# Patient Record
Sex: FEMALE | Race: WHITE | Employment: FULL TIME | URBAN - METROPOLITAN AREA
[De-identification: names, ages, dates, MRNs, and addresses within clinical notes are randomized per-mention and may not be internally consistent; named-entity substitution may affect disease eponyms.]

---

## 2017-07-06 ENCOUNTER — HOSPITAL ENCOUNTER (OUTPATIENT)
Dept: OTHER | Age: 52
Discharge: HOME | End: 2017-07-06
Attending: INTERNAL MEDICINE | Admitting: INTERNAL MEDICINE

## 2017-07-06 LAB
ALBUMIN SERPL-MCNC: 4.1 GM/DL (ref 3.4–5)
ALP BLD-CCNC: 51 IU/L (ref 40–128)
ALT SERPL-CCNC: 46 U/L (ref 10–40)
ANION GAP SERPL CALCULATED.3IONS-SCNC: 14 MMOL/L (ref 4–16)
AST SERPL-CCNC: 42 IU/L (ref 15–37)
BILIRUB SERPL-MCNC: 0.5 MG/DL (ref 0–1)
BUN BLDV-MCNC: 15 MG/DL (ref 6–23)
CALCIUM SERPL-MCNC: 9 MG/DL (ref 8.3–10.6)
CHLORIDE BLD-SCNC: 106 MMOL/L (ref 99–110)
CO2: 23 MMOL/L (ref 21–32)
CREAT SERPL-MCNC: 0.7 MG/DL (ref 0.6–1.1)
GFR AFRICAN AMERICAN: >60 ML/MIN/1.73M2
GFR NON-AFRICAN AMERICAN: >60 ML/MIN/1.73M2
GLUCOSE BLD-MCNC: 89 MG/DL (ref 70–140)
POTASSIUM SERPL-SCNC: 4.5 MMOL/L (ref 3.5–5.1)
SODIUM BLD-SCNC: 143 MMOL/L (ref 135–145)
TOTAL PROTEIN: 6.6 GM/DL (ref 6.4–8.2)

## 2017-07-08 LAB — CA 27.29: 14.8 U/ML

## 2017-07-11 ENCOUNTER — HOSPITAL ENCOUNTER (OUTPATIENT)
Dept: ULTRASOUND IMAGING | Age: 52
Discharge: HOME | End: 2017-07-11
Attending: RADIOLOGY | Admitting: RADIOLOGY

## 2017-07-11 DIAGNOSIS — C50.512 BREAST CANCER OF LOWER-OUTER QUADRANT OF LEFT FEMALE BREAST (HCC): ICD-10-CM

## 2017-07-11 DIAGNOSIS — C50.512 MALIGNANT NEOPLASM OF LOWER-OUTER QUADRANT OF LEFT FEMALE BREAST (HCC): ICD-10-CM

## 2018-07-26 ENCOUNTER — HOSPITAL ENCOUNTER (OUTPATIENT)
Dept: WOMENS IMAGING | Age: 53
Discharge: HOME | End: 2018-07-26
Attending: RADIOLOGY | Admitting: RADIOLOGY

## 2018-07-26 DIAGNOSIS — C50.512 MALIGNANT NEOPLASM OF LOWER-OUTER QUADRANT OF LEFT FEMALE BREAST, UNSPECIFIED ESTROGEN RECEPTOR STATUS (HCC): ICD-10-CM

## 2018-07-26 DIAGNOSIS — C50.512 MALIGNANT NEOPLASM OF LOWER-OUTER QUADRANT OF LEFT FEMALE BREAST (HCC): ICD-10-CM

## 2018-08-06 ENCOUNTER — HOSPITAL ENCOUNTER (OUTPATIENT)
Dept: OTHER | Age: 53
Discharge: HOME | End: 2018-08-06
Attending: INTERNAL MEDICINE | Admitting: INTERNAL MEDICINE

## 2018-08-06 LAB
ALBUMIN SERPL-MCNC: 4.3 GM/DL (ref 3.4–5)
ALP BLD-CCNC: 54 IU/L (ref 40–128)
ALT SERPL-CCNC: 33 U/L (ref 10–40)
ANION GAP SERPL CALCULATED.3IONS-SCNC: 16 MMOL/L (ref 4–16)
AST SERPL-CCNC: 30 IU/L (ref 15–37)
BILIRUB SERPL-MCNC: 0.3 MG/DL (ref 0–1)
BUN BLDV-MCNC: 14 MG/DL (ref 6–23)
CALCIUM SERPL-MCNC: 9.1 MG/DL (ref 8.3–10.6)
CHLORIDE BLD-SCNC: 104 MMOL/L (ref 99–110)
CO2: 24 MMOL/L (ref 21–32)
CREAT SERPL-MCNC: 0.7 MG/DL (ref 0.6–1.1)
GFR AFRICAN AMERICAN: >60 ML/MIN/1.73M2
GFR NON-AFRICAN AMERICAN: >60 ML/MIN/1.73M2
GLUCOSE BLD-MCNC: 78 MG/DL (ref 70–99)
POTASSIUM SERPL-SCNC: 4 MMOL/L (ref 3.5–5.1)
SODIUM BLD-SCNC: 144 MMOL/L (ref 135–145)
TOTAL PROTEIN: 6.2 GM/DL (ref 6.4–8.2)

## 2018-08-08 LAB — CA 27.29: 13.1 U/ML

## 2019-03-20 ENCOUNTER — HOSPITAL ENCOUNTER (OUTPATIENT)
Dept: ULTRASOUND IMAGING | Age: 54
End: 2019-03-20
Payer: COMMERCIAL

## 2019-03-20 ENCOUNTER — HOSPITAL ENCOUNTER (OUTPATIENT)
Dept: WOMENS IMAGING | Age: 54
Discharge: HOME | End: 2019-03-20
Payer: COMMERCIAL

## 2019-03-20 DIAGNOSIS — C50.412 MALIGNANT NEOPLASM OF UPPER-OUTER QUADRANT OF LEFT FEMALE BREAST, UNSPECIFIED ESTROGEN RECEPTOR STATUS (HCC): ICD-10-CM

## 2019-03-20 DIAGNOSIS — N64.4 BREAST PAIN: ICD-10-CM

## 2019-03-20 PROCEDURE — G0279 TOMOSYNTHESIS, MAMMO: HCPCS

## 2019-11-23 ENCOUNTER — HOSPITAL ENCOUNTER (EMERGENCY)
Age: 54
Discharge: HOME | End: 2019-11-23
Attending: EMERGENCY MEDICINE
Payer: COMMERCIAL

## 2019-11-23 VITALS
OXYGEN SATURATION: 94 % | HEART RATE: 81 BPM | HEIGHT: 64 IN | SYSTOLIC BLOOD PRESSURE: 121 MMHG | TEMPERATURE: 98.2 F | RESPIRATION RATE: 16 BRPM | BODY MASS INDEX: 27.31 KG/M2 | WEIGHT: 160 LBS | DIASTOLIC BLOOD PRESSURE: 75 MMHG

## 2019-11-23 DIAGNOSIS — R45.851 SUICIDAL IDEATION: ICD-10-CM

## 2019-11-23 DIAGNOSIS — F41.9 ANXIETY: Primary | ICD-10-CM

## 2019-11-23 LAB
ACETAMINOPHEN LEVEL: <5 UG/ML (ref 15–30)
ALBUMIN SERPL-MCNC: 4.5 GM/DL (ref 3.4–5)
ALCOHOL SCREEN SERUM: 0.14 %WT/VOL
ALCOHOL SCREEN SERUM: NORMAL %WT/VOL
ALP BLD-CCNC: 60 IU/L (ref 40–129)
ALT SERPL-CCNC: 28 U/L (ref 10–40)
AMPHETAMINES: NEGATIVE
ANION GAP SERPL CALCULATED.3IONS-SCNC: 13 MMOL/L (ref 4–16)
AST SERPL-CCNC: 27 IU/L (ref 15–37)
BARBITURATE SCREEN URINE: NEGATIVE
BASOPHILS ABSOLUTE: 0 K/CU MM
BASOPHILS RELATIVE PERCENT: 0.3 % (ref 0–1)
BENZODIAZEPINE SCREEN, URINE: NEGATIVE
BILIRUB SERPL-MCNC: 0.2 MG/DL (ref 0–1)
BUN BLDV-MCNC: 9 MG/DL (ref 6–23)
CALCIUM SERPL-MCNC: 9 MG/DL (ref 8.3–10.6)
CANNABINOID SCREEN URINE: NEGATIVE
CHLORIDE BLD-SCNC: 107 MMOL/L (ref 99–110)
CO2: 25 MMOL/L (ref 21–32)
COCAINE METABOLITE: ABNORMAL
CREAT SERPL-MCNC: 0.9 MG/DL (ref 0.6–1.1)
DIFFERENTIAL TYPE: ABNORMAL
DOSE AMOUNT: ABNORMAL
DOSE AMOUNT: ABNORMAL
DOSE TIME: ABNORMAL
DOSE TIME: ABNORMAL
EOSINOPHILS ABSOLUTE: 0.1 K/CU MM
EOSINOPHILS RELATIVE PERCENT: 1.2 % (ref 0–3)
GFR AFRICAN AMERICAN: >60 ML/MIN/1.73M2
GFR NON-AFRICAN AMERICAN: >60 ML/MIN/1.73M2
GLUCOSE BLD-MCNC: 106 MG/DL (ref 70–99)
HCT VFR BLD CALC: 45.8 % (ref 37–47)
HEMOGLOBIN: 14.9 GM/DL (ref 12.5–16)
IMMATURE NEUTROPHIL %: 0.2 % (ref 0–0.43)
LYMPHOCYTES ABSOLUTE: 2.8 K/CU MM
LYMPHOCYTES RELATIVE PERCENT: 26.8 % (ref 24–44)
MCH RBC QN AUTO: 32.8 PG (ref 27–31)
MCHC RBC AUTO-ENTMCNC: 32.5 % (ref 32–36)
MCV RBC AUTO: 100.9 FL (ref 78–100)
MONOCYTES ABSOLUTE: 0.5 K/CU MM
MONOCYTES RELATIVE PERCENT: 5 % (ref 0–4)
NUCLEATED RBC %: 0 %
OPIATES, URINE: NEGATIVE
OXYCODONE: ABNORMAL
PDW BLD-RTO: 12.9 % (ref 11.7–14.9)
PHENCYCLIDINE, URINE: NEGATIVE
PLATELET # BLD: 299 K/CU MM (ref 140–440)
PMV BLD AUTO: 9.9 FL (ref 7.5–11.1)
POTASSIUM SERPL-SCNC: 4.2 MMOL/L (ref 3.5–5.1)
RBC # BLD: 4.54 M/CU MM (ref 4.2–5.4)
SALICYLATE LEVEL: 1 MG/DL (ref 15–30)
SEGMENTED NEUTROPHILS ABSOLUTE COUNT: 6.9 K/CU MM
SEGMENTED NEUTROPHILS RELATIVE PERCENT: 66.5 % (ref 36–66)
SODIUM BLD-SCNC: 145 MMOL/L (ref 135–145)
TOTAL IMMATURE NEUTOROPHIL: 0.02 K/CU MM
TOTAL NUCLEATED RBC: 0 K/CU MM
TOTAL PROTEIN: 7.4 GM/DL (ref 6.4–8.2)
WBC # BLD: 10.3 K/CU MM (ref 4–10.5)

## 2019-11-23 PROCEDURE — 6370000000 HC RX 637 (ALT 250 FOR IP): Performed by: EMERGENCY MEDICINE

## 2019-11-23 PROCEDURE — 85025 COMPLETE CBC W/AUTO DIFF WBC: CPT

## 2019-11-23 PROCEDURE — G0480 DRUG TEST DEF 1-7 CLASSES: HCPCS

## 2019-11-23 PROCEDURE — 80053 COMPREHEN METABOLIC PANEL: CPT

## 2019-11-23 PROCEDURE — 99284 EMERGENCY DEPT VISIT MOD MDM: CPT

## 2019-11-23 PROCEDURE — 36415 COLL VENOUS BLD VENIPUNCTURE: CPT

## 2019-11-23 PROCEDURE — 80307 DRUG TEST PRSMV CHEM ANLYZR: CPT

## 2019-11-23 RX ORDER — LORAZEPAM 1 MG/1
1 TABLET ORAL ONCE
Status: COMPLETED | OUTPATIENT
Start: 2019-11-23 | End: 2019-11-23

## 2019-11-23 RX ADMIN — LORAZEPAM 1 MG: 1 TABLET ORAL at 11:40

## 2020-02-20 ENCOUNTER — HOSPITAL ENCOUNTER (OUTPATIENT)
Dept: INFUSION THERAPY | Age: 55
Discharge: HOME | End: 2020-02-20
Payer: COMMERCIAL

## 2020-02-20 LAB
ALBUMIN SERPL-MCNC: 4.4 GM/DL (ref 3.4–5)
ALP BLD-CCNC: 61 IU/L (ref 40–128)
ALT SERPL-CCNC: 53 U/L (ref 10–40)
ANION GAP SERPL CALCULATED.3IONS-SCNC: 14 MMOL/L (ref 4–16)
AST SERPL-CCNC: 47 IU/L (ref 15–37)
BASOPHILS ABSOLUTE: 0 K/CU MM
BASOPHILS RELATIVE PERCENT: 0.2 % (ref 0–1)
BILIRUB SERPL-MCNC: 0.3 MG/DL (ref 0–1)
BUN BLDV-MCNC: 15 MG/DL (ref 6–23)
CALCIUM SERPL-MCNC: 9.4 MG/DL (ref 8.3–10.6)
CHLORIDE BLD-SCNC: 103 MMOL/L (ref 99–110)
CO2: 25 MMOL/L (ref 21–32)
CREAT SERPL-MCNC: 0.7 MG/DL (ref 0.6–1.1)
DIFFERENTIAL TYPE: ABNORMAL
EOSINOPHILS ABSOLUTE: 0.2 K/CU MM
EOSINOPHILS RELATIVE PERCENT: 2.4 % (ref 0–3)
GFR AFRICAN AMERICAN: >60 ML/MIN/1.73M2
GFR NON-AFRICAN AMERICAN: >60 ML/MIN/1.73M2
GLUCOSE BLD-MCNC: 108 MG/DL (ref 70–99)
HCT VFR BLD CALC: 45.3 % (ref 37–47)
HEMOGLOBIN: 15 GM/DL (ref 12.5–16)
LYMPHOCYTES ABSOLUTE: 2.4 K/CU MM
LYMPHOCYTES RELATIVE PERCENT: 26 % (ref 24–44)
MCH RBC QN AUTO: 33.3 PG (ref 27–31)
MCHC RBC AUTO-ENTMCNC: 33.1 % (ref 32–36)
MCV RBC AUTO: 100.4 FL (ref 78–100)
MONOCYTES ABSOLUTE: 0.5 K/CU MM
MONOCYTES RELATIVE PERCENT: 5.8 % (ref 0–4)
PDW BLD-RTO: 14 % (ref 11.7–14.9)
PLATELET # BLD: 290 K/CU MM (ref 140–440)
PMV BLD AUTO: 10.2 FL (ref 7.5–11.1)
POTASSIUM SERPL-SCNC: 4.4 MMOL/L (ref 3.5–5.1)
RBC # BLD: 4.51 M/CU MM (ref 4.2–5.4)
SEGMENTED NEUTROPHILS ABSOLUTE COUNT: 6.1 K/CU MM
SEGMENTED NEUTROPHILS RELATIVE PERCENT: 65.6 % (ref 36–66)
SODIUM BLD-SCNC: 142 MMOL/L (ref 135–145)
TOTAL PROTEIN: 7.1 GM/DL (ref 6.4–8.2)
WBC # BLD: 9.3 K/CU MM (ref 4–10.5)

## 2020-02-20 PROCEDURE — 86300 IMMUNOASSAY TUMOR CA 15-3: CPT

## 2020-02-20 PROCEDURE — 36592 COLLECT BLOOD FROM PICC: CPT

## 2020-02-20 PROCEDURE — 36415 COLL VENOUS BLD VENIPUNCTURE: CPT

## 2020-02-20 PROCEDURE — 80053 COMPREHEN METABOLIC PANEL: CPT

## 2020-02-20 PROCEDURE — 85025 COMPLETE CBC W/AUTO DIFF WBC: CPT

## 2020-02-23 LAB
CA 27.29: 15 U/ML (ref 0–40)
CA 27.29: NORMAL U/ML (ref 0–40)

## 2020-03-12 ENCOUNTER — HOSPITAL ENCOUNTER (OUTPATIENT)
Dept: MRI IMAGING | Age: 55
Discharge: HOME | End: 2020-03-12
Payer: COMMERCIAL

## 2020-03-12 ENCOUNTER — HOSPITAL ENCOUNTER (OUTPATIENT)
Dept: NUCLEAR MEDICINE | Age: 55
Discharge: HOME | End: 2020-03-12
Payer: COMMERCIAL

## 2020-03-12 PROCEDURE — 6360000004 HC RX CONTRAST MEDICATION: Performed by: INTERNAL MEDICINE

## 2020-03-12 PROCEDURE — 72156 MRI NECK SPINE W/O & W/DYE: CPT

## 2020-03-12 PROCEDURE — A9579 GAD-BASE MR CONTRAST NOS,1ML: HCPCS | Performed by: INTERNAL MEDICINE

## 2020-03-12 PROCEDURE — A9503 TC99M MEDRONATE: HCPCS | Performed by: INTERNAL MEDICINE

## 2020-03-12 PROCEDURE — 3430000000 HC RX DIAGNOSTIC RADIOPHARMACEUTICAL: Performed by: INTERNAL MEDICINE

## 2020-03-12 PROCEDURE — 78306 BONE IMAGING WHOLE BODY: CPT

## 2020-03-12 RX ORDER — TC 99M MEDRONATE 20 MG/10ML
25 INJECTION, POWDER, LYOPHILIZED, FOR SOLUTION INTRAVENOUS
Status: COMPLETED | OUTPATIENT
Start: 2020-03-12 | End: 2020-03-12

## 2020-03-12 RX ADMIN — TC 99M MEDRONATE 25 MILLICURIE: 20 INJECTION, POWDER, LYOPHILIZED, FOR SOLUTION INTRAVENOUS at 09:50

## 2020-03-12 RX ADMIN — GADOTERIDOL 15 ML: 279.3 INJECTION, SOLUTION INTRAVENOUS at 10:22

## 2020-08-07 PROBLEM — R30.9 PAINFUL MICTURITION, UNSPECIFIED: Status: ACTIVE | Noted: 2020-08-07

## 2020-08-07 PROBLEM — M89.8X9 OTHER SPECIFIED DISORDERS OF BONE, UNSPECIFIED SITE: Status: ACTIVE | Noted: 2020-08-07

## 2020-08-07 PROBLEM — G64 OTHER DISORDERS OF PERIPHERAL NERVOUS SYSTEM: Status: ACTIVE | Noted: 2020-08-07

## 2020-08-07 PROBLEM — R35.0 FREQUENCY OF MICTURITION: Status: ACTIVE | Noted: 2020-08-07

## 2020-08-07 PROBLEM — C50.412 MALIGNANT NEOPLASM OF UPPER-OUTER QUADRANT OF LEFT FEMALE BREAST (HCC): Status: ACTIVE | Noted: 2020-08-07

## 2021-01-28 DIAGNOSIS — Z12.31 SCREENING MAMMOGRAM FOR HIGH-RISK PATIENT: ICD-10-CM

## 2021-02-03 ENCOUNTER — HOSPITAL ENCOUNTER (OUTPATIENT)
Dept: WOMENS IMAGING | Age: 56
Discharge: HOME | End: 2021-02-03
Payer: COMMERCIAL

## 2021-02-03 DIAGNOSIS — Z12.31 SCREENING MAMMOGRAM FOR HIGH-RISK PATIENT: ICD-10-CM

## 2021-02-03 PROCEDURE — 77063 BREAST TOMOSYNTHESIS BI: CPT

## 2021-02-10 ENCOUNTER — HOSPITAL ENCOUNTER (OUTPATIENT)
Dept: INFUSION THERAPY | Age: 56
Discharge: HOME | End: 2021-02-10
Payer: COMMERCIAL

## 2021-02-10 ENCOUNTER — OFFICE VISIT (OUTPATIENT)
Dept: ONCOLOGY | Age: 56
End: 2021-02-10
Payer: COMMERCIAL

## 2021-02-10 VITALS
HEART RATE: 112 BPM | BODY MASS INDEX: 29.5 KG/M2 | RESPIRATION RATE: 16 BRPM | HEIGHT: 64 IN | DIASTOLIC BLOOD PRESSURE: 91 MMHG | WEIGHT: 172.8 LBS | SYSTOLIC BLOOD PRESSURE: 151 MMHG | OXYGEN SATURATION: 96 % | TEMPERATURE: 96.7 F

## 2021-02-10 DIAGNOSIS — C50.912 MALIGNANT NEOPLASM OF LEFT BREAST IN FEMALE, ESTROGEN RECEPTOR POSITIVE, UNSPECIFIED SITE OF BREAST (HCC): Primary | ICD-10-CM

## 2021-02-10 DIAGNOSIS — Z17.0 MALIGNANT NEOPLASM OF LEFT BREAST IN FEMALE, ESTROGEN RECEPTOR POSITIVE, UNSPECIFIED SITE OF BREAST (HCC): ICD-10-CM

## 2021-02-10 DIAGNOSIS — Z17.0 MALIGNANT NEOPLASM OF LEFT BREAST IN FEMALE, ESTROGEN RECEPTOR POSITIVE, UNSPECIFIED SITE OF BREAST (HCC): Primary | ICD-10-CM

## 2021-02-10 DIAGNOSIS — Z78.0 MENOPAUSE: ICD-10-CM

## 2021-02-10 DIAGNOSIS — C50.912 MALIGNANT NEOPLASM OF LEFT BREAST IN FEMALE, ESTROGEN RECEPTOR POSITIVE, UNSPECIFIED SITE OF BREAST (HCC): ICD-10-CM

## 2021-02-10 LAB
ALBUMIN SERPL-MCNC: 4.3 GM/DL (ref 3.4–5)
ALP BLD-CCNC: 59 IU/L (ref 40–129)
ALT SERPL-CCNC: 49 U/L (ref 10–40)
ANION GAP SERPL CALCULATED.3IONS-SCNC: 11 MMOL/L (ref 4–16)
AST SERPL-CCNC: 46 IU/L (ref 15–37)
BASOPHILS ABSOLUTE: 0 K/CU MM
BASOPHILS RELATIVE PERCENT: 0.2 % (ref 0–1)
BILIRUB SERPL-MCNC: 0.4 MG/DL (ref 0–1)
BUN BLDV-MCNC: 9 MG/DL (ref 6–23)
CALCIUM SERPL-MCNC: 9.4 MG/DL (ref 8.3–10.6)
CHLORIDE BLD-SCNC: 101 MMOL/L (ref 99–110)
CO2: 26 MMOL/L (ref 21–32)
CREAT SERPL-MCNC: 1 MG/DL (ref 0.6–1.1)
DIFFERENTIAL TYPE: ABNORMAL
EOSINOPHILS ABSOLUTE: 0.2 K/CU MM
EOSINOPHILS RELATIVE PERCENT: 2.1 % (ref 0–3)
GFR AFRICAN AMERICAN: >60 ML/MIN/1.73M2
GFR NON-AFRICAN AMERICAN: 58 ML/MIN/1.73M2
GLUCOSE BLD-MCNC: 83 MG/DL (ref 70–99)
HCT VFR BLD CALC: 45.5 % (ref 37–47)
HEMOGLOBIN: 15.3 GM/DL (ref 12.5–16)
LYMPHOCYTES ABSOLUTE: 2.6 K/CU MM
LYMPHOCYTES RELATIVE PERCENT: 24.7 % (ref 24–44)
MCH RBC QN AUTO: 33.1 PG (ref 27–31)
MCHC RBC AUTO-ENTMCNC: 33.6 % (ref 32–36)
MCV RBC AUTO: 98.5 FL (ref 78–100)
MONOCYTES ABSOLUTE: 0.7 K/CU MM
MONOCYTES RELATIVE PERCENT: 6.3 % (ref 0–4)
PDW BLD-RTO: 13.9 % (ref 11.7–14.9)
PLATELET # BLD: 292 K/CU MM (ref 140–440)
PMV BLD AUTO: 10 FL (ref 7.5–11.1)
POTASSIUM SERPL-SCNC: 4.4 MMOL/L (ref 3.5–5.1)
RBC # BLD: 4.62 M/CU MM (ref 4.2–5.4)
SEGMENTED NEUTROPHILS ABSOLUTE COUNT: 7.1 K/CU MM
SEGMENTED NEUTROPHILS RELATIVE PERCENT: 66.7 % (ref 36–66)
SODIUM BLD-SCNC: 138 MMOL/L (ref 135–145)
TOTAL PROTEIN: 7.1 GM/DL (ref 6.4–8.2)
WBC # BLD: 10.6 K/CU MM (ref 4–10.5)

## 2021-02-10 PROCEDURE — 86300 IMMUNOASSAY TUMOR CA 15-3: CPT

## 2021-02-10 PROCEDURE — 85025 COMPLETE CBC W/AUTO DIFF WBC: CPT

## 2021-02-10 PROCEDURE — 36415 COLL VENOUS BLD VENIPUNCTURE: CPT

## 2021-02-10 PROCEDURE — 99211 OFF/OP EST MAY X REQ PHY/QHP: CPT

## 2021-02-10 PROCEDURE — 99214 OFFICE O/P EST MOD 30 MIN: CPT | Performed by: INTERNAL MEDICINE

## 2021-02-10 PROCEDURE — 80053 COMPREHEN METABOLIC PANEL: CPT

## 2021-02-10 RX ORDER — IBUPROFEN 600 MG/1
600 TABLET ORAL EVERY 6 HOURS PRN
COMMUNITY

## 2021-02-10 RX ORDER — ANASTROZOLE 1 MG/1
1 TABLET ORAL DAILY
Qty: 90 TABLET | Refills: 5 | Status: SHIPPED | OUTPATIENT
Start: 2021-02-10 | End: 2022-04-04

## 2021-02-10 RX ORDER — OLANZAPINE 5 MG/1
TABLET ORAL
COMMUNITY
Start: 2020-12-09 | End: 2021-09-13 | Stop reason: SDUPTHER

## 2021-02-10 RX ORDER — VENLAFAXINE 75 MG/1
TABLET ORAL
COMMUNITY
Start: 2020-12-09 | End: 2021-12-17 | Stop reason: SDUPTHER

## 2021-02-10 ASSESSMENT — PATIENT HEALTH QUESTIONNAIRE - PHQ9
SUM OF ALL RESPONSES TO PHQ QUESTIONS 1-9: 2
SUM OF ALL RESPONSES TO PHQ QUESTIONS 1-9: 2

## 2021-02-10 NOTE — PROGRESS NOTES
MA Rooming Questions  Patient: Soila Kathleen  MRN: [id number]    Date: 2/10/2021        1. Do you have any new issues?   no         2. Do you need any refills on medications?    no    3. Have you had any imaging done since your last visit?   no    4. Have you been hospitalized or seen in the emergency room since your last visit here?   no    5. Did the patient have a depression screening completed today?  Yes    PHQ-9 Total Score: 2 (2/10/2021  2:00 PM)       PHQ-9 Given to (if applicable):               PHQ-9 Score (if applicable):                     [] Positive     [x]  Negative              Does question #9 need addressed (if applicable)                     [] Yes    []  No               Jacquelin Onofre MA

## 2021-02-10 NOTE — PROGRESS NOTES
Patient Name: Alejandra Love  Patient : 1965  Patient MRN: [id number]     Primary Oncologist: Nick Huynh MD  PCP:Dr Lacey Duran     Date of Service: 2/10/2021      Chief Complaint:   Chief Complaint   Patient presents with    Follow-up        Active Problem list  1. Malignant neoplasm of left breast in female, estrogen receptor positive, unspecified site of breast [facility])           HPI:        Mrs. Chad Ruelas is an extremely pleasant young lady, patient of Dr. Florina Naylor and MARCELA Page Date of birth 1965. She saw Ms. Lacey Duran on 2015, with about a one week history of noticing a lump involving the lateral part of her left breast, which led to mammogram on 2015, as well as ultrasound showing suspicious abnormality involving the left breast measuring around 0.7 cm. Also normal left axillary lymph node. She did undergo ultrasound confirming the above findings and on 2015, did undergo ultrasound-guided biopsy of the left breast as well as ultrasound-guided biopsy of the left axillary node, too. Pathology from the left breast at 4 o'clock position revealed infiltrating ductal carcinoma. Left axillary node did reveal metastatic ductal carcinoma. ER 95 percent positive, WI 95 percent positive, HER-2 negative. .. Saw Dr. Florina Naylor on 2015, was scheduled to have a lumpectomy as well as axillary dissection on 2015. ... 2015, she did undergo lumpectomy and axillary dissection per Dr. Florina Naylor. Pathology revealed multifocal three separate lesions measuring 0.7, 0.7, and 0.3 cm. Moderately differentiated infiltrating ductal carcinoma. Margins were negative, 5/15 lymph nodes were positive for macrometastases, one with micrometastases; pT1b N2a disease. Recovered well from surgery. A port was inserted, too. ...      2015, chest x-ray was negative for any abnormality, bone scan 2015, was negative for any obvious metastases, too.     CT scan of the chest done on May 7, 2015, revealed 1.3 by 1.9 cm left axillary versus chest wall area lymph node like structure indeterminate nature could be postoperative; however, metastatic disease was thought of, too. No CT evidence of any metastatic disease in the chest. A 3 mm non-calcified subpleural nodule in the right costophrenic angle, which had been stable since September 2014, too. Cardiac echo done on May 7, 2015, had revealed normal ejection fraction, too. ... May 11, 2015, started on day 1, day 8 A/C and finished on August 10, 2015, tolerated these treatments with mild degree of symptoms of weakness, tiredness, as well as hot flashes and weight gain. September 1, 2015, started on weekly Taxol, finishing on November 30, 2015, with some symptoms of weakness and tiredness and some symptoms of neuropathy as well as some skin changes. December 10, 2015, did talk about the disease process and need to proceed with radiation therapy treatments as well as treatment with tamoxifen. Did receive radiation therapy treatments per Dr. Kulwinder Javier from January 11, 2016, to March 1, 2016. Tolerated those treatments with mild degree of morbidity. CT scan of the chest June 23, 2016, was negative for any obvious metastases. Mammogram May 2016 too was negative. She came to see me on August 6, 2018. On her previous visit to the office in July 2017, Effexor dose was increased to 75 mg daily, was encouraged to stay on tamoxifen and refrain from smoking. On her visit, stated she was taking tamoxifen on a regular basis but was still smoking. Had a mammogram done on July 26, 2018 and that was unremarkable. Was seen in the office on February 26, 2019. Stated that after her last visit here on August 6, 2018 she did stop smoking. Mammogram done on March 28, 2019 was benign. She came to see me on February 20, 2020. In the interim she had been taking the tamoxifen on a regular basis. Had been working.  Stated that she has started smoking again. She did complain of having significant worsening of her bony pain involving the neck area having to take ibuprofen 600 mg 2 or 3 times per day. Studies done after her visit here included bone scan that was negative for any definite metastatic changes. MRI of the cervical spine revealed some muscle spasm causing straightening of the cervical spine. There was moderate C5-C6 spinal canal stenosis. Tumor markers were unremarkable so was chemistries except for mild elevation of liver function studies possibly from the use of alcohol. CBC had revealed mild macrocytosis also possibly from the use of alcohol. Dr Gage Mail Notes:   ------------------------  2/3/21: mammogram:  1. No mammographic evidence of malignancy. 2. Stable lumpectomy changes in the left outer breast at 9 o'clock.       BIRADS:   BIRADS - CATEGORY 2       PAST MEDICAL PROBLEMS:  Mrs. Spencer in general had been blessed with good health. Has not had any major health problems to speak of, except for tonsillectomy and D&C. FAMILY HISTORY:  Great maternal aunt had carcinoma of the breast. Interestingly her two other sister in laws also had carcinoma of the breast, too. SOCIAL HISTORY:  Long history of smoking, more than one pack for 30 years or more, moderate history of ethanol intake. She is , mother of two boys, and works at [email or web address]. Interval History  2/10/2021: Arrived alone to the clinic today. Reported hot flashes , but not well controlled with Effexor. No chest pain, increased sob, palpitations or any  dizzness. No new breast masses. No nipple discharge. No axillary area. No weight loss. No abdominal pain, nausea, emesis, diarrhea or any constipation. No  sx. No HA. Reported fatigue.      Review of Systems   Per interval history; otherwise 10 point ROS is negative              Vital Signs:  BP (!) 151/91 (Site: Right Upper Arm, Position: Sitting, Cuff Size: Medium Adult)   Pulse 112 Temp 96.7 °F (35.9 °C) (Infrared)   Resp 16   Ht 5' 4\" (1.626 m)   Wt 172 lb 12.8 oz (78.4 kg)   LMP 05/06/2015   SpO2 96%   BMI 29.66 kg/m²     Physical Exam:    CONSTITUTIONAL: awake, alert, obese  EYES:RAN, No palor or any icetrus  ENT: ATNC  NECK: No JVD  HEMATOLOGIC/LYMPHATIC: no cervical, supraclavicular or axillary lymphadenopathy   LUNGS: CTAB  CARDIOVASCULAR: s1s2 tachycardia, no murmurs  ABDOMEN: soft ntnd b spos  NEUROLOGIC: GI  SKIN: No rash  EXTREMITIES: no LE edema bilaterally  Breast: Right breast with no mass, left breast, intact scar inferiolateral, No mass, no nipple discharge. No axillary lad bilaterally.      Labs:    Hematology:  Lab Results   Component Value Date    WBC 9.3 02/20/2020    RBC 4.51 02/20/2020    HGB 15.0 02/20/2020    HCT 45.3 02/20/2020    .4 (H) 02/20/2020    MCH 33.3 (H) 02/20/2020    MCHC 33.1 02/20/2020    RDW 14.0 02/20/2020     02/20/2020    MPV 10.2 02/20/2020    SEGSPCT 65.6 02/20/2020    EOSRELPCT 2.4 02/20/2020    BASOPCT 0.2 02/20/2020    LYMPHOPCT 26.0 02/20/2020    MONOPCT 5.8 (H) 02/20/2020    SEGSABS 6.1 02/20/2020    EOSABS 0.2 02/20/2020    BASOSABS 0.0 02/20/2020    LYMPHSABS 2.4 02/20/2020    MONOSABS 0.5 02/20/2020    DIFFTYPE AUTOMATED DIFFERENTIAL 02/20/2020     No results found for: ESR    Chemistry:  Lab Results   Component Value Date     02/20/2020    K 4.4 02/20/2020     02/20/2020    CO2 25 02/20/2020    BUN 15 02/20/2020    CREATININE 0.7 02/20/2020    GLUCOSE 108 (H) 02/20/2020    CALCIUM 9.4 02/20/2020    PROT 7.1 02/20/2020    LABALBU 4.4 02/20/2020    BILITOT 0.3 02/20/2020    ALKPHOS 61 02/20/2020    AST 47 (H) 02/20/2020    ALT 53 (H) 02/20/2020    LABGLOM >60 02/20/2020    GFRAA >60 02/20/2020     No results found for: MMA, LDH, HOMOCYSTEINE  No components found for: LD  No results found for: TSHHS, T4FREE, FT3    Immunology:  Lab Results   Component Value Date    PROT 7.1 02/20/2020     No results found for: MORNEA Wiggins  No results found for: B2M    Coagulation Panel:  No results found for: PROTIME, INR, APTT, DDIMER    Anemia Panel:  No results found for: NVMQRIRO79, FOLATE    Tumor Markers:  Lab Results   Component Value Date    LABCA2 15.0 2020    LABCA2  2020     (NOTE)  INTERPRETIVE INFORMATION: Cancer Antigen 27.29  The CA 27.29 assay is intended for use in monitorin) disease   progression and/or response to therapy in patients with metastatic   disease, and 2) disease recurrence in patients treated previously   for stages II or III breast carcinoma who are clinically free of   the disease. Serial testing in patients who are clinically free of   disease should be used in conjunction with other clinical methods   for early detection of cancer recurrence. Limitations: Patients with confirmed breast carcinoma frequently   have CA 27.29 assay values in the same range as healthy   individuals. Elevations may also be observed in patients with non   malignant disease. Results of this test must always be interpreted   in the context of morphologic and other relevant data, and should   not be used alone for a diagnosis of malignancy. Methodology: Siemens Advia Darkstrandaur CA 27.29 chemiluminescent   immunoassay was used. Results obtained with different assay   methods or kits cannot be used interchangeably. [facility]formed [facility] [address], [address] [facility] [phone]  www. Carmencita Head MD, Lab. Director           Imaging: Reviewed     Pathology:Reviewed     Observations:  Performance Status: ECOG 0  Depression Status: PHQ-9 Total Score: 2 (2/10/2021  2:00 PM)          Assessment & Plan:  Extremely pleasant young lady, longstanding history of smoking, mild to moderate degree of ethanol intake.  Premenopausal.     Felt a lump involving the left breast, which lead to mammogram, ultrasound, and biopsy on 2015, showing infiltrating moderately-differentiated ductal carcinoma, strongly positive ER, IA, and negative HER-2. April 16, 2015, did undergo additional lumpectomy and axillary dissection. Three separate lesions were noted, 0.7, 0.7, 0.3 cm, and 5/15 axillary nodes were positive for involvement. Chest x-ray, bone scan, and CT scan of the chest were negative for any obvious metastases. A CT of the chest did show a small lymph node like structure in the axillary area or the chest wall area possibly relating to surgery, too. A 4 mm nodule was described, too. A cardiac echo had revealed a normal ejection fraction, too. May 20, 2015, started on A/C day one day eight schedule, finishing August 10, 2015. September 1, 2015, started on weekly Taxol, finishing on November 30, 2015. Tolerated these treatments with mild to moderate degree of morbidit    December 10, 2015, did talk about starting therapy with tamoxifen as well as radiation therapy. Finished receiving radiation therapy treatments as described above on March 1, 2016. June 2016 started on Effexor for hot flashes. Long discussion with her on her visit to the office on August 6, 2018. We talked about of course need for her to stay on tamoxifen that she had been taking on a regular basis did write her a new prescription of 20 mg tablets daily. We talked about staying on Effexor to manage some of her symptoms of hot flashes    We also talked about need for her to have colonoscopy exam. She though wanted for now to hold off as her grandmother had bad experience with a colonoscopy. Mammogram March 2019 was benign. March 17, 2020 we did review the results. No obvious signs of recurrence of the disease. Did reassure her in that regards. Advised her of course to stay on tamoxifen and December 2020 possibly will need to change to an AI. She was noted to have mild elevation of MCV as well as liver function studies possibly relating to alcohol.     She was to see me again in 6 months with

## 2021-02-12 ENCOUNTER — TELEPHONE (OUTPATIENT)
Dept: ONCOLOGY | Age: 56
End: 2021-02-12

## 2021-02-12 LAB — CA 27.29: 17.1 U/ML (ref 0–40)

## 2021-02-23 ENCOUNTER — TELEPHONE (OUTPATIENT)
Dept: ONCOLOGY | Age: 56
End: 2021-02-23

## 2021-02-23 NOTE — TELEPHONE ENCOUNTER
Called patient with appt time for education of new medication, Arimidex, schedule for Wed. 03/03 @ 1100, patient states understanding but didn't realize she was to have education, already started taking medication

## 2021-02-24 ENCOUNTER — HOSPITAL ENCOUNTER (OUTPATIENT)
Dept: WOMENS IMAGING | Age: 56
Discharge: HOME | End: 2021-02-24
Payer: COMMERCIAL

## 2021-02-24 DIAGNOSIS — Z78.0 MENOPAUSE: ICD-10-CM

## 2021-02-24 DIAGNOSIS — Z17.0 MALIGNANT NEOPLASM OF LEFT BREAST IN FEMALE, ESTROGEN RECEPTOR POSITIVE, UNSPECIFIED SITE OF BREAST (HCC): ICD-10-CM

## 2021-02-24 DIAGNOSIS — C50.912 MALIGNANT NEOPLASM OF LEFT BREAST IN FEMALE, ESTROGEN RECEPTOR POSITIVE, UNSPECIFIED SITE OF BREAST (HCC): ICD-10-CM

## 2021-02-24 PROCEDURE — 77080 DXA BONE DENSITY AXIAL: CPT

## 2021-04-14 ENCOUNTER — HOSPITAL ENCOUNTER (OUTPATIENT)
Dept: INFUSION THERAPY | Age: 56
Discharge: HOME | End: 2021-04-14
Payer: COMMERCIAL

## 2021-04-14 ENCOUNTER — INITIAL CONSULT (OUTPATIENT)
Dept: ONCOLOGY | Age: 56
End: 2021-04-14
Payer: COMMERCIAL

## 2021-04-14 VITALS
WEIGHT: 177.2 LBS | BODY MASS INDEX: 30.25 KG/M2 | OXYGEN SATURATION: 96 % | TEMPERATURE: 96.9 F | HEART RATE: 94 BPM | SYSTOLIC BLOOD PRESSURE: 157 MMHG | DIASTOLIC BLOOD PRESSURE: 67 MMHG | RESPIRATION RATE: 18 BRPM | HEIGHT: 64 IN

## 2021-04-14 DIAGNOSIS — C50.912 MALIGNANT NEOPLASM OF LEFT BREAST IN FEMALE, ESTROGEN RECEPTOR POSITIVE, UNSPECIFIED SITE OF BREAST (HCC): Primary | ICD-10-CM

## 2021-04-14 DIAGNOSIS — R59.9 ENLARGED LYMPH NODE: ICD-10-CM

## 2021-04-14 DIAGNOSIS — Z17.0 MALIGNANT NEOPLASM OF LEFT BREAST IN FEMALE, ESTROGEN RECEPTOR POSITIVE, UNSPECIFIED SITE OF BREAST (HCC): Primary | ICD-10-CM

## 2021-04-14 PROCEDURE — 99215 OFFICE O/P EST HI 40 MIN: CPT | Performed by: NURSE PRACTITIONER

## 2021-04-14 NOTE — PROGRESS NOTES
MA Rooming Questions  Patient: Gabe Rosales  MRN: [id number]    Date: 4/14/2021        1. Do you have any new issues?   no         2. Do you need any refills on medications? yes - Arimidex, olanzapine    3. Have you had any imaging done since your last visit?   no    4. Have you been hospitalized or seen in the emergency room since your last visit here?   no    5. Did the patient have a depression screening completed today?  No    No data recorded     PHQ-9 Given to (if applicable):               PHQ-9 Score (if applicable):                     [] Positive     []  Negative              Does question #9 need addressed (if applicable)                     [] Yes    []  No               Zabrina Mathias MA

## 2021-04-14 NOTE — PROGRESS NOTES
[facility]     Cancer Treatment Summary and  Survivorship Care Plan    Provided by SHAGUFTA Poole on 04/14/21        This [address] is a brief record of major aspects of your cancer treatment. The care plan provides a way for a cancer survivor to review and retain information about their cancer, cancer treatment, and follow-up care. The care plan is also meant to provide basic information about a survivor's care to future healthcare providers. You can share your copy with any of your doctors or nurses; however, this is not a detailed or comprehensive record of your care. General Information   Patient Name Erlin Long    Patient ID [id number]    Address [facility], [address] [place] [address]   Phone [phone] (home)    Date of Birth 1965    Age 50 y.o. Support Contact Extended Emergency Contact Information  Primary Emergency Contact: Cherri Bates  Address: [address] [place] [address], [address] Phone: [phone]  Mobile Phone: [phone]  Relation: Parent      Health Care Team   Medical Oncologist Dr. Anu Chapman - Phone: [phone]   Radiation Oncologist Dr. Randolph Solorio - Phone:  [phone]   Surgeon   Dr. La Stiles - Phone:  [phone]   Primary Care Physician No primary care provider on file., Phone:  None   Nurse Navigator Angelo Griggs RN, Nurse Navigator Phone: [phone]        Cancer Diagnosis Information    Cancer Type/Location   Breast cancer, left breast [facility])    Histologic Subtype/Pathology Results   Left breast multifocal invasive ductal carcinoma, ER/NC positive, HER-2/zaki negative. Diagnosis Date   4/16/2015   Age at Diagnosis 54 y.o.    Stage at Diagnosis   Cancer Staging:    Breast cancer, left breast [facility])  Staging form: Breast, AJCC 7th Edition  - Clinical: Stage IIIA (T1c, N2a, M0) - Signed by Verito Diane MD on 2/10/2021       Tumor Markers at Diagnosis   14.8 on 7/6/17  13.1 on 8/8/18  15.0 on 2/20/20  17.1 on 2/10/21   Surgical Procedure/Location/  Findings   Left lumpectomy and axillary lymph node dissections done on 4/16/15. Final pathology revealed multifocal disease of three sites, grade 2 with negative margins, ER/ID positive, HER-2/zaki negative. Five out of 15 lymph nodes were positive. Background Information  NCCN BREAST GUIDELINES (Date of Service: ESBCPMF@)        []  Patient has already completed genetic counseling  [x]  Patient needs to be screened for genetics below     NCCN Guidelines for Genetic Testing     Single Indication     Breast Cancer (Including DCIS) diagnosis ? age 39                      [] Yes                 [x] No  Triple Negative Breast Cancer diagnosed ? age 61                       [] Yes                 [x] No  HER2 - negative metastatic breast cancer diagnosed at any age  [] Yes                 [x] No  Male breast cancer diagnosed at any age                                      [] Yes                 [x] No  A known mutation in the family                                                       [] Yes                 [x] No  Ashkenazi Christianity                                                                           [] Yes                 [x] No  BRCA1 or 2 mutation detected on tumor testing                            [] Yes                 [x] No     Family History Indication     Breast Cancer diagnosis ? age 48                                                 [x] Yes                 [] No                AND an additional breast cancer primary                           [] Yes                OR an unknown or limited family history (adopted)            [] Yes                OR One or more relatives with any of the following:          [] Yes              Breast Cancer, Prostate Creston Score ?  7     Breast cancer diagnosis at any age                                               [x] Yes                 [] No                AND One or more relatives with any of the following:        [] Yes              Breast cancer ? age 48, Ovarian cancer at any age, Male              Breast cancer at any age, Pancreatic cancer, metastatic              Prostate cancer                OR Two additional diagnosis of breast cancer at any        [x] Yes              Age in patient and/or in close blood relatives     Results of NCCN Guidelines     Patient should be referred for genetic counseling                    [x] Yes                 [] No    Has this patient had genetic testing previously completed? [] Yes                 [x] No       *NCCN Guidelines for Genetic Testing     (v3.2019 BRCA-related Breast and/or Ovarian cancer syndrome (BRCA 1/2 Testing Criteria) BRCA 1 or 2 mutation detected on tumor testing)   Genetic/Hereditary Risk Factor(s) or Predisposing Conditions   Cancer-related family history includes Breast Cancer in some other family members. Genetic Counseling Performed?   no   Genetic Testing Results      Medical History   Past Medical History:   Diagnosis Date    [facility] [address]     Breast cancer ([facility] [address] 01/01/2015    left    Cancer [facility])     path report 4/2015- ductal Ca( left breast)    Depression       Surgical History    Past Surgical History:   Procedure Laterality Date    BREAST LUMPECTOMY Left 4/16/2015    with axillary sampling, port insertion on right    BREAST SURGERY  4/2015    left breast bx     DILATION AND CURETTAGE OF UTERUS      x3- last one 1995               CHEMOTHERAPY yes     Treatment Regimen   Adriamycin/Cytoxan x4 cycles then Taxol x12 cycles.      Dates of Treatment    2015   Cumulative Dose Lifetime Dose Tracking  doxorubicin   Projected Total Dose 460 MG (243.386 MG/M2) Recommended Max Lifetime Dose 1039.5 MG (550 MG/M2         RADIATION yes     Anatomical Area Treated by Radiation    Left breast - 4500 cGy and lumpectomy bed boost - 1600 cGy for a total cumulative dose of 6100 cGy with 5000 cGy to the left SCV area. Dates of Treatment    Start Date: 1/11/16            Stop Date: 3/1/16     SURGERY/CHEMOTHERAPY/  RADIATION      Treatment-related hospitalization required?   no     Information concerning the likely course of recovery from these toxicities. Most side effects of radiation generally go away within a few weeks to 2 months of finishing treatment. But some side effects may continue after treatment is over because it takes time for healthy cells to recover from the effects of radiation therapy. The deep redness and the sensitivity of the skin should start to go away during the first weeks after treatment. Your skin will take a bit longer to return completely to its natural color. You may find that the treated area has a tanned or slightly pinkish look to it for up to 6 months after your last session of radiation. For the skin to heal, it needs time to regenerate, a process that may take two to four weeks for mild reactions, or several months or more for serious injuries. In the interim, various supportive care therapies may be used to soothe the itching and pain that often results. Radiation can give you fatigue that tends to get worse over time. It usually lasts 3 to 4 weeks after your treatment stops, but it can continue for up to 3 months.         Ongoing Treatment    MEDICATION ALLERGIES No Known Allergies   CURRENT MEDICATION LIST       Current Outpatient Medications   Medication Sig Dispense Refill    OLANZapine (ZYPREXA) 5 MG tablet       venlafaxine (EFFEXOR) 75 MG tablet       ibuprofen (ADVIL;MOTRIN) 600 MG tablet Take 600 mg by mouth every 6 hours as needed for Pain      anastrozole (ARIMIDEX) 1 MG tablet Take 1 tablet by mouth daily 90 tablet 5    tamoxifen (NOLVADEX) 10 MG tablet Take 10 mg by mouth daily       No current facility-administered medications for this visit. Need for Ongoing (Adjuvant) Treatment for Cancer?   yes - adjuvant hormonal therapy  Was on Tamoxifen x5 years and just switched to Arimidex   Additional treatment name Planned duration Possible Side effects   Arimidex (anastrozole) For 5 years or as directed by Medical Oncologist. Possible Side Effects of Anastrozole (Arimidex):  Side effects may include weakness, fatigue, headache, mood swings, depression, nausea, mild diarrhea, increased or decreased appetite, sweating, hot flashes, vaginal dryness, temporary hair thinning, joint pain, bone pain and weakness, and lower bone density. Follow-Up Care Plan    Continue all standard non-cancer related health care with your Primary Care Provider. Any new, unusual and/or persistent symptoms should be brought to the attention of your provider. Primary Care Physician No primary care provider on file., None    Coordinating Provider When / How Often? Medical Oncology   Visits    Dr. Eren Kaufman  Phone:  [phone] Every 3-6 months for 3 years, then every 6-12 months until 5 years or completion of therapy. These visits may be alternated with your medical, surgical, and/or radiation oncologist.    Next appointment day/time is 8/11/21 at 3:30 pm.                Cancer Surveillance or Other                 Recommended Tests When/Where Scheduled? Lab Tests   As indicated by Medical Oncologist. As indicated by provider. Imaging   Mammogram - Annually or as stated per oncology team.    Pap/Pelvic Exam - As indicated by provider. Colonoscopy - As indicated by provider. Bone Density - Every 2 years if on an aromatase inhibitor or as indicated by your provider.         Potential late- and long-term effects of treatment(s)    Possible Long-Term Side Effects from Chemotherapy:  Side effects may include fatigue, difficulty with focused thinking (sometimes called chemo brain), early menopause (for women), infertility, heart problems, reduced lung capacity, kidney and urinary problems, nerve problems such as numbness and tingling, bone and joint problems, muscle weakness and secondary cancers. Possible Long-Term Side Effects from Radiation:  Side effects may include fatigue, breast tenderness/swelling, hand or arm swelling, breast firmness, changes in skin color and thickness, scarring, mild decrease in range of motion and secondary cancers. Needs or Concerns for Survivors After Treatment   Additional Services Offered:  Cancer survivors may experience issues with the areas listed below. If you have any concerns in these or other areas, please speak with your doctors or nurses to find out how you can get help with them.  -emotional and mental health      -fatigue          -weight changes        -stopping smoking  -physical functioning                    -insurance       -school/work             -parenting  -financial advice or assistance     -fertility                     -sexual functioning  -memory or concentration loss       Healthy Lifestyle Changes:  A number of lifestyle/behaviors can affect your on-going health, including the risk for the cancer coming back or developing another cancer. Discuss these recommendations with your doctor or nurse.  -tobacco use/cessation                         -diet  -alcohol use                                           -sunscreen use  -weight management (loss/gain)          -physical activity     LYMPHEDEMA:  Lymphedema is abnormal swelling that can develop in the arm, hand, breast, or torso as a side effect of breast cancer surgery and/or radiation therapy. Lymphedema can appear in some people during the months or even years after treatment ends. Lymph is a thin, clear fluid that circulates throughout the body to remove wastes, bacteria, and other substances from tissues. Edema is the buildup of excess fluid.  So lymphedema occurs when too much lymph collects in any area of the regional (in nearby lymph nodes), or in a distant area as described below:    Local Recurrence:  A local breast cancer recurrence means the cancer has come back in the same breast.  Signs and symptoms of local recurrence within the same breast may include:  -a new lump in your breast or irregular area of firmness  -a new area of the breast that seems unnaturally firm  -redness or swelling of the skin in or around the breast area   -flattening or other changes to the nipple including nipple discharge  -bumps on or under the skin of the chest wall   -new pulling of skin or swelling at the lumpectomy site   -a new thickening on or near the mastectomy scar    After breast cancer surgery and radiation, the entire breast area can be swollen and red for as long as a few months. But if you have concerns about any changes you notice in your breasts, talk to your doctor. Redness also can be a sign of infection, so it's a good idea to have your doctor look at any areas of concern. If you had a mastectomy and had your breast reconstructed, you may get harmless lumps caused by a build-up of scar tissue or dead fat cells in the reconstructed breast. These types of lumps aren't cancer. Still, your doctor needs to know about any lumps you feel in your breast so they can be monitored for any change in size or tenderness. Since mastectomy and reconstruction usually removes all of the breast tissue and replaces it with other tissue and/or an implant, mammogram is not usually recommended for reconstructed breasts. Your doctor can monitor any new lumps on a reconstructed breast by performing a clinical breast exam. He or she may also recommend additional screening methods such as MRI. Regional Recurrence:  A regional breast cancer recurrence means the cancer has come back in the nearby lymph nodes.   Signs and symptoms of regional recurrence may include:  -a lump or swelling in the lymph nodes under the arm, above the collarbone, near the breastbone, or in your neck   -swelling in the arm on the same side where the breast cancer was first found   -constant pain in the arm and shoulder   -loss of feeling in the arm and shoulder  -constant pain in the chest   -problems swallowing    Distant Recurrence:  A distant (metastatic) recurrence means the cancer has traveled to distant parts of the body, most commonly the bones, liver, lungs, and brain. Signs and symptoms of distant recurrence may include:  -persistent and worsening pain, such as chest or bone pain  -numbness or weakness anywhere in the body  -persistent cough  -difficulty breathing  -loss of appetite  -constant nausea  -weight loss  -severe headaches  -seizures  -vision problems  -loss of balance  -confusion    Contact your medical/radiation oncologists, surgeon, or PCP if you need assistance in these areas of concern or if you experience any new, unusual, or persistent symptoms. Community Resources   *www.[employer]/locations/specialty-locations/cancer-care-oncology/[facility]-     [place]  *www. [employer]/location/[facility]/  *http://[facility].org/  *www. [employer]. org/  *Contact Devorah Ross, Psychosocial Coordinator, at [phone] for support group information     offered at [facility] and within the community. *Contact Severo Palm, RN, BSN, CN-BN, Breast Health Navigator, at [phone] for any     additional questions/concerns. Survivorship Patient Resources   *American Cancer Society ([place]. org/SurvivorshipCenter)  *Cancer Survivors Network (csn.cancer. org)  *https://www.cheryl.Seriously/. org/  *[facility]on (https://Scurricolumbus. org)  *LIVESTRONG (www.livestrong. org)  [address] (www.survivorship.cancer.gov/springboard)  [address] [facility] [employer] (www.canceradvocacy. org)   *Ellen D[address]       (www.nccn.org/patients/resources/life_after_cancer/)

## 2021-04-15 ENCOUNTER — CLINICAL DOCUMENTATION (OUTPATIENT)
Dept: CASE MANAGEMENT | Age: 56
End: 2021-04-15

## 2021-04-15 NOTE — PROGRESS NOTES
Patient here yesterday, 4/14/21, for Survivorship appointment with SHAGUFTA Carroll. Patient was started on Arimidex recently after taking Tamoxifen for 5 years. Will be on Arimidex x5 years now. Side effects reviewed with patient and educational printout given. Direct contact information given to call with any questions/concerns. Patient voiced understanding of all.

## 2021-04-16 ENCOUNTER — TELEPHONE (OUTPATIENT)
Dept: ONCOLOGY | Age: 56
End: 2021-04-16

## 2021-04-20 ENCOUNTER — TELEPHONE (OUTPATIENT)
Dept: ONCOLOGY | Age: 56
End: 2021-04-20

## 2021-04-20 NOTE — TELEPHONE ENCOUNTER
Called patient advising her of the referral for pyschiatry and that she would need to call [id number] [facility] [phone], patient states understanding

## 2021-04-21 ENCOUNTER — CLINICAL DOCUMENTATION (OUTPATIENT)
Dept: RADIATION ONCOLOGY | Age: 56
End: 2021-04-21

## 2021-04-21 NOTE — CARE COORDINATION
Pt referred to Libra Roberts for oncology exercise services and to [facility], THE counselor Loida Carias, [facility] for supportive counseling.

## 2021-04-22 ENCOUNTER — HOSPITAL ENCOUNTER (OUTPATIENT)
Age: 56
Setting detail: SPECIMEN
Discharge: HOME | End: 2021-04-22
Payer: COMMERCIAL

## 2021-04-22 ENCOUNTER — OFFICE VISIT (OUTPATIENT)
Dept: SURGERY | Age: 56
End: 2021-04-22
Payer: COMMERCIAL

## 2021-04-22 ENCOUNTER — TELEPHONE (OUTPATIENT)
Dept: SURGERY | Age: 56
End: 2021-04-22

## 2021-04-22 VITALS
HEART RATE: 99 BPM | BODY MASS INDEX: 31.07 KG/M2 | WEIGHT: 182 LBS | OXYGEN SATURATION: 98 % | TEMPERATURE: 97.9 F | HEIGHT: 64 IN | DIASTOLIC BLOOD PRESSURE: 72 MMHG | SYSTOLIC BLOOD PRESSURE: 128 MMHG

## 2021-04-22 DIAGNOSIS — Z95.828 PORT-A-CATH IN PLACE: Primary | ICD-10-CM

## 2021-04-22 DIAGNOSIS — Z01.818 PRE-OP TESTING: Primary | ICD-10-CM

## 2021-04-22 PROCEDURE — U0005 INFEC AGEN DETEC AMPLI PROBE: HCPCS

## 2021-04-22 PROCEDURE — 99212 OFFICE O/P EST SF 10 MIN: CPT | Performed by: SURGERY

## 2021-04-22 PROCEDURE — U0003 INFECTIOUS AGENT DETECTION BY NUCLEIC ACID (DNA OR RNA); SEVERE ACUTE RESPIRATORY SYNDROME CORONAVIRUS 2 (SARS-COV-2) (CORONAVIRUS DISEASE [COVID-19]), AMPLIFIED PROBE TECHNIQUE, MAKING USE OF HIGH THROUGHPUT TECHNOLOGIES AS DESCRIBED BY CMS-2020-01-R: HCPCS

## 2021-04-22 NOTE — PROGRESS NOTES
Karen Petersen is here for cancer of the left  Breast.  Initial size multifocal, 2cm. Nodes resected 15. Numbers positive: 4. Stage: T1 N1 M0. Procedure: lumpectomy with axillary dissection  Date: 4/16/2015   Hormonal Therapy:yes  Chemotherapy: completed  Radiation Therapy: done  Genetic Testing: no genetic mutation found  ONCOTYPE not indicated  ER+/MI+/HER2-    Current Evaluation:   Today the patient is basically feeling well. She denies new breast mass, nipple discharge, or breast pain. No new subcutaneous mass, headache, bone pain, new cough, or abdominal pain. She would like to have her port removed. Physical Exam:   Generally healthy appearing,  female   Skin: no new subcutaneous mass   Breast: non-tender, no new mass   Lungs clear   Heart RRR  Lymphadenopathy: no new axillary and supraclavicular   Abdomen: non-tender without liver, spleen, kidney, or mass palpable     Mammography:   Last mammogram: 2/3/21   Mammogram is unchanged from previous mammograms.  Radiologist report is negative for malignancy     Impression:   No evidence of systemic or recurrent breast cancer:   No evidence of second primary breast cancer     Plan: RTC 2 weeks after port removal.  Will need a mammogram in Feb 2022

## 2021-04-22 NOTE — PROGRESS NOTES
.Surgery:  4/28/21, you will be called 4/27/21 with times    1. You can eat a light breakfast. Do not eat within 2 hours of the procedure. 2. Follow your directions as prescribed by the doctor for your procedure and medications. 3. Do not take any pain medication within 6 hours of your procedure  4. Do not drink any alcoholic beverages or use any street drugs 24 hours before procedure. 5. Please wear simple, loose fitting clothing to the hospital.  Do not bring valuables (money,           credit cards, checkbooks, etc.)     6. If you  have a Living Will and Durable Power of  for Healthcare, please bring in a copy. 7. Please bring picture ID,  insurance card, paperwork from the doctors office          (H & P, Consent,  & card for implantable devices). 8. Report to the information desk on the ground floor. 9. Take a shower the night before or morning of your procedure, do not apply any lotion, oil or powder. 10. Have your covid test done 10 days before your procedure. Quarantine until after your procedure.

## 2021-04-24 LAB
SARS-COV-2: NOT DETECTED
SOURCE: NORMAL

## 2021-04-26 ENCOUNTER — HOSPITAL ENCOUNTER (OUTPATIENT)
Dept: ULTRASOUND IMAGING | Age: 56
Discharge: HOME | End: 2021-04-26
Payer: COMMERCIAL

## 2021-04-26 DIAGNOSIS — R59.9 ENLARGED LYMPH NODE: ICD-10-CM

## 2021-04-26 PROCEDURE — 76536 US EXAM OF HEAD AND NECK: CPT

## 2021-04-28 ENCOUNTER — CLINICAL DOCUMENTATION (OUTPATIENT)
Dept: CASE MANAGEMENT | Age: 56
End: 2021-04-28

## 2021-04-28 ENCOUNTER — TELEPHONE (OUTPATIENT)
Dept: SURGERY | Age: 56
End: 2021-04-28

## 2021-04-28 ENCOUNTER — HOSPITAL ENCOUNTER (OUTPATIENT)
Age: 56
Setting detail: OUTPATIENT SURGERY
Discharge: HOME | End: 2021-04-28
Attending: SURGERY | Admitting: SURGERY
Payer: COMMERCIAL

## 2021-04-28 VITALS
SYSTOLIC BLOOD PRESSURE: 155 MMHG | WEIGHT: 182 LBS | DIASTOLIC BLOOD PRESSURE: 95 MMHG | HEIGHT: 64 IN | OXYGEN SATURATION: 98 % | HEART RATE: 77 BPM | RESPIRATION RATE: 16 BRPM | TEMPERATURE: 97.7 F | BODY MASS INDEX: 31.07 KG/M2

## 2021-04-28 DIAGNOSIS — C50.912 MALIGNANT NEOPLASM OF LEFT BREAST IN FEMALE, ESTROGEN RECEPTOR POSITIVE, UNSPECIFIED SITE OF BREAST (HCC): Primary | ICD-10-CM

## 2021-04-28 DIAGNOSIS — Z17.0 MALIGNANT NEOPLASM OF LEFT BREAST IN FEMALE, ESTROGEN RECEPTOR POSITIVE, UNSPECIFIED SITE OF BREAST (HCC): Primary | ICD-10-CM

## 2021-04-28 PROCEDURE — 7100000010 HC PHASE II RECOVERY - FIRST 15 MIN: Performed by: SURGERY

## 2021-04-28 PROCEDURE — 7100000011 HC PHASE II RECOVERY - ADDTL 15 MIN: Performed by: SURGERY

## 2021-04-28 PROCEDURE — 2500000003 HC RX 250 WO HCPCS: Performed by: SURGERY

## 2021-04-28 PROCEDURE — 36590 REMOVAL TUNNELED CV CATH: CPT | Performed by: SURGERY

## 2021-04-28 PROCEDURE — 2709999900 HC NON-CHARGEABLE SUPPLY: Performed by: SURGERY

## 2021-04-28 PROCEDURE — 3600000002 HC SURGERY LEVEL 2 BASE: Performed by: SURGERY

## 2021-04-28 PROCEDURE — 3600000012 HC SURGERY LEVEL 2 ADDTL 15MIN: Performed by: SURGERY

## 2021-04-28 RX ORDER — LIDOCAINE HYDROCHLORIDE 10 MG/ML
INJECTION, SOLUTION INFILTRATION; PERINEURAL
Status: COMPLETED | OUTPATIENT
Start: 2021-04-28 | End: 2021-04-28

## 2021-04-28 ASSESSMENT — PAIN SCALES - GENERAL: PAINLEVEL_OUTOF10: 0

## 2021-04-28 NOTE — H&P
Lucía Shook MD H&P    PATIENT NAME: Sara Yañez   YOB: 1965    ADMISSION DATE: 4/28/2021. TODAY'S DATE: 4/28/2021    CHIEF COMPLAINT:  Port in pl;ace3      HISTORY OF PRESENT ILLNESS:  The patient is a 50 y.o. female  who presents with a port that she would like removed. She is finished with chemotherapy and does not need it anymore. She is having breast reconstruction and needs it out so it will not interfere with the reconstruction. Past Medical History:        Diagnosis Date    [facility] ([facility] [address]     Breast cancer ([facility] [address] 01/01/2015    left    Cancer [facility])     path report 4/2015- ductal Ca( left breast)    Depression        Past Surgical History:        Procedure Laterality Date    BREAST LUMPECTOMY Left 4/16/2015    with axillary sampling, port insertion on right    BREAST SURGERY  4/2015    left breast bx     DILATION AND CURETTAGE OF UTERUS      x3- last one [address]       Medications Prior to Admission:   No medications prior to admission. Allergies:  Patient has no known allergies. Social History:   TOBACCO:   reports that she has been smoking cigarettes. She has a 30.00 pack-year smoking history. She has never used smokeless tobacco.  ETOH:   reports current alcohol use. DRUGS:   reports previous drug use.   MARITAL STATUS:    OCCUPATION:        Family History:       Problem Relation Age of Onset    Breast Cancer Other     Breast Cancer Other        REVIEW OF SYSTEMS:    CONSTITUTIONAL:  negative  HEENT:  negative  CARDIOVASCULAR:  negative  GASTROINTESTINAL:  negative  GENITOURINARY:  negative  HEMATOLOGIC/LYMPHATIC:  negative  ENDOCRINE:  negative    PHYSICAL EXAM:    VITALS:  Ht 5' 4\" (1.626 m)   Wt 182 lb (82.6 kg)   LMP 05/06/2015   BMI 31.24 kg/m²   CONSTITUTIONAL:  awake, alert, no apparent distress and mildly obese  ENT:  normocepalic, without obvious abnormality  NECK:  supple, symmetrical, trachea midline   LUNGS:  clear to auscultation  CARDIOVASCULAR:  regular rate and rhythm   Breasts: There is a port in place in the right upper breast/chest area  GASTROINTESTINAL;   normal bowel sounds, soft, non-distended, non-tender, voluntary guarding absent, no masses palpated   MUSCULOSKELETAL:  0+ pitting edema lower extremities  NEUROLOGIC:  Mental Status Exam:  Level of Alertness:   awake  Orientation:   person, place, time      DATA:  CBC:   Lab Results   Component Value Date    WBC 10.6 02/10/2021    RBC 4.62 02/10/2021    HGB 15.3 02/10/2021    HCT 45.5 02/10/2021    MCV 98.5 02/10/2021    MCH 33.1 02/10/2021    MCHC 33.6 02/10/2021    RDW 13.9 02/10/2021     02/10/2021    MPV 10.0 02/10/2021     CMP:    Lab Results   Component Value Date     02/10/2021    K 4.4 02/10/2021     02/10/2021    CO2 26 02/10/2021    BUN 9 02/10/2021    CREATININE 1.0 02/10/2021    GFRAA >60 02/10/2021    LABGLOM 58 02/10/2021    GLUCOSE 83 02/10/2021    PROT 7.1 02/10/2021    LABALBU 4.3 02/10/2021    CALCIUM 9.4 02/10/2021    BILITOT 0.4 02/10/2021    ALKPHOS 59 02/10/2021    AST 46 02/10/2021    ALT 49 02/10/2021       ASSESSMENT AND PLAN:POrt in place. Will plan removal of the port under local anesthesia. The risks, benefits and alternatives to the planned procedure were discussed. Patient expressed an understanding and is willing to proceed. The patient was counseled at length about the risks of pearl Covid-19 during their perioperative period and any recovery window from their procedure. The patient was made aware that pearl Covid-19  may worsen their prognosis for recovering from their procedure  and lend to a higher morbidity and/or mortality risk. All material risks, benefits, and reasonable alternatives including postponing the procedure were discussed. The patient does wish to proceed with the procedure at this time.     Pastor Beebe

## 2021-04-28 NOTE — PROGRESS NOTES
Results from soft tissue neck ultrasound done on 4/26/21 phoned to patient - message left. Patient was on surgery schedule for today to have Mediport removed. Direct contact information given to patient to call with any questions/concerns.

## 2021-04-28 NOTE — TELEPHONE ENCOUNTER
Pt stopped by the office after her surgery to get an updated note off work, Pt states that Dr. Hairston Felt ok'd two days off of work and will pick it up Thursday, 29th. Please advise that letter can be given. Never smoker

## 2021-04-29 ENCOUNTER — CLINICAL DOCUMENTATION (OUTPATIENT)
Dept: RADIATION ONCOLOGY | Age: 56
End: 2021-04-29

## 2021-05-06 ENCOUNTER — OFFICE VISIT (OUTPATIENT)
Dept: SURGERY | Age: 56
End: 2021-05-06

## 2021-05-06 VITALS
HEART RATE: 94 BPM | SYSTOLIC BLOOD PRESSURE: 154 MMHG | TEMPERATURE: 97 F | RESPIRATION RATE: 16 BRPM | DIASTOLIC BLOOD PRESSURE: 89 MMHG

## 2021-05-06 DIAGNOSIS — Z09 POSTOP CHECK: ICD-10-CM

## 2021-05-06 DIAGNOSIS — Z95.828 PORT-A-CATH IN PLACE: Primary | ICD-10-CM

## 2021-05-06 PROCEDURE — 99024 POSTOP FOLLOW-UP VISIT: CPT | Performed by: SURGERY

## 2021-06-04 ENCOUNTER — TELEPHONE (OUTPATIENT)
Dept: INFUSION THERAPY | Age: 56
End: 2021-06-04

## 2021-09-13 ENCOUNTER — HOSPITAL ENCOUNTER (OUTPATIENT)
Dept: INFUSION THERAPY | Age: 56
Discharge: HOME | End: 2021-09-13
Payer: COMMERCIAL

## 2021-09-13 ENCOUNTER — OFFICE VISIT (OUTPATIENT)
Dept: ONCOLOGY | Age: 56
End: 2021-09-13
Payer: COMMERCIAL

## 2021-09-13 VITALS
HEART RATE: 110 BPM | TEMPERATURE: 98.9 F | WEIGHT: 173.2 LBS | HEIGHT: 64 IN | BODY MASS INDEX: 29.57 KG/M2 | SYSTOLIC BLOOD PRESSURE: 129 MMHG | OXYGEN SATURATION: 97 % | DIASTOLIC BLOOD PRESSURE: 90 MMHG

## 2021-09-13 DIAGNOSIS — R22.1 LOCALIZED SWELLING, MASS AND LUMP, NECK: ICD-10-CM

## 2021-09-13 DIAGNOSIS — C50.912 MALIGNANT NEOPLASM OF LEFT BREAST IN FEMALE, ESTROGEN RECEPTOR POSITIVE, UNSPECIFIED SITE OF BREAST (HCC): Primary | ICD-10-CM

## 2021-09-13 DIAGNOSIS — Z17.0 MALIGNANT NEOPLASM OF LEFT BREAST IN FEMALE, ESTROGEN RECEPTOR POSITIVE, UNSPECIFIED SITE OF BREAST (HCC): ICD-10-CM

## 2021-09-13 DIAGNOSIS — Z17.0 MALIGNANT NEOPLASM OF LEFT BREAST IN FEMALE, ESTROGEN RECEPTOR POSITIVE, UNSPECIFIED SITE OF BREAST (HCC): Primary | ICD-10-CM

## 2021-09-13 DIAGNOSIS — C50.912 MALIGNANT NEOPLASM OF LEFT BREAST IN FEMALE, ESTROGEN RECEPTOR POSITIVE, UNSPECIFIED SITE OF BREAST (HCC): ICD-10-CM

## 2021-09-13 PROCEDURE — 99211 OFF/OP EST MAY X REQ PHY/QHP: CPT

## 2021-09-13 PROCEDURE — 99214 OFFICE O/P EST MOD 30 MIN: CPT | Performed by: INTERNAL MEDICINE

## 2021-09-13 RX ORDER — OLANZAPINE 5 MG/1
5 TABLET ORAL NIGHTLY
Qty: 30 TABLET | Refills: 3 | Status: SHIPPED | OUTPATIENT
Start: 2021-09-13 | End: 2021-09-16 | Stop reason: SDUPTHER

## 2021-09-13 ASSESSMENT — PATIENT HEALTH QUESTIONNAIRE - PHQ9
SUM OF ALL RESPONSES TO PHQ9 QUESTIONS 1 & 2: 1
2. FEELING DOWN, DEPRESSED OR HOPELESS: 1
SUM OF ALL RESPONSES TO PHQ QUESTIONS 1-9: 1
1. LITTLE INTEREST OR PLEASURE IN DOING THINGS: 0
SUM OF ALL RESPONSES TO PHQ QUESTIONS 1-9: 1
SUM OF ALL RESPONSES TO PHQ QUESTIONS 1-9: 1

## 2021-09-13 NOTE — PROGRESS NOTES
MA Rooming Questions  Patient: Miguel Carson  MRN: [id number]    Date: 9/13/2021        1. Do you have any new issues?   no         2. Do you need any refills on medications? yes - Olanzapine    3. Have you had any imaging done since your last visit? yes - u/s in April    4. Have you been hospitalized or seen in the emergency room since your last visit here?   no    5. Did the patient have a depression screening completed today?  Yes    PHQ-9 Total Score: 1 (9/13/2021  3:36 PM)       PHQ-9 Given to (if applicable):               PHQ-9 Score (if applicable):                     [] Positive     [x]  Negative              Does question #9 need addressed (if applicable)                     [] Yes    []  No               Kandi Alarcon CMA

## 2021-09-13 NOTE — PROGRESS NOTES
Patient Name: Miguel Carson  Patient : 1965  Patient MRN: [id number]     Primary Oncologist: Theron Swann MD  PCP:Dr Cornelius Chaidez     Date of Service: 2021      Chief Complaint:   Chief Complaint   Patient presents with    Follow-up        Active Problem list  1. Malignant neoplasm of left breast in female, estrogen receptor positive, unspecified site of breast [facility])           HPI:        Mrs. CHRISTOPHER [facility] is an extremely pleasant young lady, patient of Dr. Isaac Wright and MARCELA Keyes Date of birth 1965. She saw Ms. Cornelius Chaidez on 2015, with about a one week history of noticing a lump involving the lateral part of her left breast, which led to mammogram on 2015, as well as ultrasound showing suspicious abnormality involving the left breast measuring around 0.7 cm. Also normal left axillary lymph node. She did undergo ultrasound confirming the above findings and on 2015, did undergo ultrasound-guided biopsy of the left breast as well as ultrasound-guided biopsy of the left axillary node, too. Pathology from the left breast at 4 o'clock position revealed infiltrating ductal carcinoma. Left axillary node did reveal metastatic ductal carcinoma. ER 95 percent positive, KY 95 percent positive, HER-2 negative. .. Saw Dr. Isaac Wright on 2015, was scheduled to have a lumpectomy as well as axillary dissection on 2015. ... 2015, she did undergo lumpectomy and axillary dissection per Dr. Isaac Wright. Pathology revealed multifocal three separate lesions measuring 0.7, 0.7, and 0.3 cm. Moderately differentiated infiltrating ductal carcinoma. Margins were negative, 5/15 lymph nodes were positive for macrometastases, one with micrometastases; pT1b N2a disease. Recovered well from surgery. A port was inserted, too. ...      2015, chest x-ray was negative for any abnormality, bone scan 2015, was negative for any obvious metastases, too.     CT scan of the chest done on May 7, 2015, revealed 1.3 by 1.9 cm left axillary versus chest wall area lymph node like structure indeterminate nature could be postoperative; however, metastatic disease was thought of, too. No CT evidence of any metastatic disease in the chest. A 3 mm non-calcified subpleural nodule in the right costophrenic angle, which had been stable since September 2014, too. Cardiac echo done on May 7, 2015, had revealed normal ejection fraction, too. ... May 11, 2015, started on day 1, day 8 A/C and finished on August 10, 2015, tolerated these treatments with mild degree of symptoms of weakness, tiredness, as well as hot flashes and weight gain. September 1, 2015, started on weekly Taxol, finishing on November 30, 2015, with some symptoms of weakness and tiredness and some symptoms of neuropathy as well as some skin changes. December 10, 2015, did talk about the disease process and need to proceed with radiation therapy treatments as well as treatment with tamoxifen. Did receive radiation therapy treatments per Dr. Maia Jain from January 11, 2016, to March 1, 2016. Tolerated those treatments with mild degree of morbidity. CT scan of the chest June 23, 2016, was negative for any obvious metastases. Mammogram May 2016 too was negative. She came to see me on August 6, 2018. On her previous visit to the office in July 2017, Effexor dose was increased to 75 mg daily, was encouraged to stay on tamoxifen and refrain from smoking. On her visit, stated she was taking tamoxifen on a regular basis but was still smoking. Had a mammogram done on July 26, 2018 and that was unremarkable. Was seen in the office on February 26, 2019. Stated that after her last visit here on August 6, 2018 she did stop smoking. Mammogram done on March 28, 2019 was benign. She came to see me on February 20, 2020. In the interim she had been taking the tamoxifen on a regular basis. Had been working.  Stated that she has started smoking again. She did complain of having significant worsening of her bony pain involving the neck area having to take ibuprofen 600 mg 2 or 3 times per day. Studies done after her visit here included bone scan that was negative for any definite metastatic changes. MRI of the cervical spine revealed some muscle spasm causing straightening of the cervical spine. There was moderate C5-C6 spinal canal stenosis. Tumor markers were unremarkable so was chemistries except for mild elevation of liver function studies possibly from the use of alcohol. CBC had revealed mild macrocytosis also possibly from the use of alcohol. Dr Daphne Bhagat Notes:   ------------------------  2/3/21: mammogram:  1. No mammographic evidence of malignancy. 2. Stable lumpectomy changes in the left outer breast at 9 o'clock.       BIRADS:   BIRADS - CATEGORY 2       PAST MEDICAL PROBLEMS:  Mrs. Spencer in general had been blessed with good health. Has not had any major health problems to speak of, except for tonsillectomy and D&C. FAMILY HISTORY:  Great maternal aunt had carcinoma of the breast. Interestingly her two other sister in laws also had carcinoma of the breast, too. SOCIAL HISTORY:  Long history of smoking, more than one pack for 30 years or more, moderate history of ethanol intake. She is , mother of two boys, and works at [employer]. Interval History  9/13/2021: Arrived alone to the clinic today. Reported hot flashes which are stable, but not well controlled with Effexor. No chest pain, increased sob, palpitations or any  dizzness. No new breast masses. No nipple discharge. No axillary area. No weight loss. No abdominal pain, nausea, emesis, diarrhea or any constipation. No  sx. No HA. Reported fatigue. Smokes 1/2 PPD. Reported neck fullness bilaterally left > right. Lot of crying and screaming. Has not been able to sleep.      Review of Systems   Per interval history; otherwise 10 point ROS is negative              Vital Signs:  BP (!) 129/90 (Site: Right Upper Arm, Position: Sitting, Cuff Size: Medium Adult)   Pulse 110   Temp 98.9 °F (37.2 °C) (Infrared)   Ht 5' 4\" (1.626 m)   Wt 173 lb 3.2 oz (78.6 kg)   LMP 05/06/2015   SpO2 97%   BMI 29.73 kg/m²     Physical Exam:    CONSTITUTIONAL: awake, alert, obese, tired appearing. EYES:RAN, No palor or any icetrus  ENT: ATNC  NECK: No JVD, bilateral neck fullness. Did not appreciate any lad  HEMATOLOGIC/LYMPHATIC: no cervical, supraclavicular or axillary lymphadenopathy   LUNGS: CTAB  CARDIOVASCULAR: s1s2 tachycardia, no murmurs  ABDOMEN: soft ntnd b spos  NEUROLOGIC: GI  SKIN: No rash  EXTREMITIES: no LE edema bilaterally  Breast: Right breast with no mass, left breast, intact scar inferiolateral, No mass, no nipple discharge. No axillary lad bilaterally.      Labs:    Hematology:  Lab Results   Component Value Date    WBC 10.6 (H) 02/10/2021    RBC 4.62 02/10/2021    HGB 15.3 02/10/2021    HCT 45.5 02/10/2021    MCV 98.5 02/10/2021    MCH 33.1 (H) 02/10/2021    MCHC 33.6 02/10/2021    RDW 13.9 02/10/2021     02/10/2021    MPV 10.0 02/10/2021    SEGSPCT 66.7 (H) 02/10/2021    EOSRELPCT 2.1 02/10/2021    BASOPCT 0.2 02/10/2021    LYMPHOPCT 24.7 02/10/2021    MONOPCT 6.3 (H) 02/10/2021    SEGSABS 7.1 02/10/2021    EOSABS 0.2 02/10/2021    BASOSABS 0.0 02/10/2021    LYMPHSABS 2.6 02/10/2021    MONOSABS 0.7 02/10/2021    DIFFTYPE AUTOMATED DIFFERENTIAL 02/10/2021     No results found for: ESR    Chemistry:  Lab Results   Component Value Date     02/10/2021    K 4.4 02/10/2021     02/10/2021    CO2 26 02/10/2021    BUN 9 02/10/2021    CREATININE 1.0 02/10/2021    GLUCOSE 83 02/10/2021    CALCIUM 9.4 02/10/2021    PROT 7.1 02/10/2021    LABALBU 4.3 02/10/2021    BILITOT 0.4 02/10/2021    ALKPHOS 59 02/10/2021    AST 46 (H) 02/10/2021    ALT 49 (H) 02/10/2021    LABGLOM 58 (L) 02/10/2021    GFRAA >60 02/10/2021     No results found for: MMA, LDH, HOMOCYSTEINE  No components found for: LD  No results found for: TSHHS, T4FREE, FT3    Immunology:  Lab Results   Component Value Date    PROT 7.1 02/10/2021     No results found for: Thuan Safe, KLFLCR  No results found for: B2M    Coagulation Panel:  No results found for: PROTIME, INR, APTT, DDIMER    Anemia Panel:  No results found for: [id number], FOLATE    Tumor Markers:  Lab Results   Component Value Date    LABCA2 17.1 02/10/2021         Imaging: Reviewed     Pathology:Reviewed     Observations:  Performance Status: ECOG 0  Depression Status: PHQ-9 Total Score: 1 (9/13/2021  3:36 PM)          Assessment & Plan:  Extremely pleasant young lady, longstanding history of smoking, mild to moderate degree of ethanol intake. Premenopausal.     Felt a lump involving the left breast, which lead to mammogram, ultrasound, and biopsy on April 8, 2015, showing infiltrating moderately-differentiated ductal carcinoma, strongly positive ER, MA, and negative HER-2. April 16, 2015, did undergo additional lumpectomy and axillary dissection. Three separate lesions were noted, 0.7, 0.7, 0.3 cm, and 5/15 axillary nodes were positive for involvement. Chest x-ray, bone scan, and CT scan of the chest were negative for any obvious metastases. A CT of the chest did show a small lymph node like structure in the axillary area or the chest wall area possibly relating to surgery, too. A 4 mm nodule was described, too. A cardiac echo had revealed a normal ejection fraction, too. May 20, 2015, started on A/C day one day eight schedule, finishing August 10, 2015. September 1, 2015, started on weekly Taxol, finishing on November 30, 2015. Tolerated these treatments with mild to moderate degree of morbidit    December 10, 2015, did talk about starting therapy with tamoxifen as well as radiation therapy. Finished receiving radiation therapy treatments as described above on March 1, 2016.       June 2016 started on Effexor for hot flashes. Long discussion with her on her visit to the office on August 6, 2018. We talked about of course need for her to stay on tamoxifen that she had been taking on a regular basis did write her a new prescription of 20 mg tablets daily. We talked about staying on Effexor to manage some of her symptoms of hot flashes    We also talked about need for her to have colonoscopy exam. She though wanted for now to hold off as her grandmother had bad experience with a colonoscopy. Mammogram March 2019 was benign. March 17, 2020 we did review the results. No obvious signs of recurrence of the disease. Did reassure her in that regards. Advised her of course to stay on tamoxifen and December 2020 possibly will need to change to an AI. She was noted to have mild elevation of MCV as well as liver function studies possibly relating to alcohol. She was to see me again in 6 months with repeat blood work. We talked about that on her next visit 1 of my partners may be seeing her    Dr Karson Winn Notes  ------------------------  IDC left breast: [id number], stage IIIA, grade 2, ER positive, MI positive Her 2 zaki negative. Diagnosed April 8 2015 and underwent lumpectomy April 15 2015. May 20, 2015, started on A/C day one day eight schedule, finishing August 10, 2015. September 1, 2015, started on weekly Taxol, finishing on November 30, 2015. Started Tamoxifen December 2015. Finished receiving radiation therapy treatments as described above on March 1, 2016. Mammogram 2021 feb BIRADS cat 2. Will repeat in a year. Switched to AI in feb 2021  and recommend for 5 Years. Baseline dexa scan in feb 2021  with osteopenia. Recommend calcium 600mg po bid  and vitamin D 500-1000IU daily. Recommend repeat dexa scan in 2 years  Ca 27-29 pending  Will follow per NCCN guidelines    Bilateral neck fullness: Ultrasound neck with small 0.7 cm cervical LN. CT neck and chest order.      Host flashes: Continue Effexor    H/O depression/bipolar affective disorder:will resume Olanzapine. Depression     Tachycardia: Asx. Recommend monitoring LA and maintaining a log and may need cardiology referal. Decreased caffeine intake and also recommend smoking cessation. Continue the medical care. Discussed above findings and plan with her and she verbalized understanding. Answered all her questions. Discussed healthy lifestyle including regular exercise as tolerated, healthy diet and weight loss if possible. Also discussed importance of being up-to-date with age-appropriate screening tools. CT lung screening ordered    Recommend follow-up with primary care physician and other specialist.    Please do not hesitate to contact us if you need further information. Return to clinic in 2 weeks or earlier if new symptoms.     [id number]       Electronically signed by Moy Golden MD on 9/13/2021 at 3:53 PM

## 2021-09-15 ENCOUNTER — TELEPHONE (OUTPATIENT)
Dept: ONCOLOGY | Age: 56
End: 2021-09-15

## 2021-09-16 RX ORDER — OLANZAPINE 5 MG/1
5 TABLET ORAL NIGHTLY
Qty: 90 TABLET | Refills: 3 | Status: SHIPPED | OUTPATIENT
Start: 2021-09-16 | End: 2021-12-15

## 2021-09-20 ENCOUNTER — HOSPITAL ENCOUNTER (OUTPATIENT)
Dept: CT IMAGING | Age: 56
Discharge: HOME | End: 2021-09-20
Payer: COMMERCIAL

## 2021-09-20 ENCOUNTER — HOSPITAL ENCOUNTER (OUTPATIENT)
Dept: INFUSION THERAPY | Age: 56
Discharge: HOME | End: 2021-09-20
Payer: COMMERCIAL

## 2021-09-20 DIAGNOSIS — Z17.0 MALIGNANT NEOPLASM OF LEFT BREAST IN FEMALE, ESTROGEN RECEPTOR POSITIVE, UNSPECIFIED SITE OF BREAST (HCC): Primary | ICD-10-CM

## 2021-09-20 DIAGNOSIS — R22.1 LOCALIZED SWELLING, MASS AND LUMP, NECK: ICD-10-CM

## 2021-09-20 DIAGNOSIS — C50.912 MALIGNANT NEOPLASM OF LEFT BREAST IN FEMALE, ESTROGEN RECEPTOR POSITIVE, UNSPECIFIED SITE OF BREAST (HCC): Primary | ICD-10-CM

## 2021-09-20 DIAGNOSIS — Z17.0 MALIGNANT NEOPLASM OF LEFT BREAST IN FEMALE, ESTROGEN RECEPTOR POSITIVE, UNSPECIFIED SITE OF BREAST (HCC): ICD-10-CM

## 2021-09-20 DIAGNOSIS — C50.912 MALIGNANT NEOPLASM OF LEFT BREAST IN FEMALE, ESTROGEN RECEPTOR POSITIVE, UNSPECIFIED SITE OF BREAST (HCC): ICD-10-CM

## 2021-09-20 LAB
ALBUMIN SERPL-MCNC: 4.3 GM/DL (ref 3.4–5)
ALP BLD-CCNC: 78 IU/L (ref 40–128)
ALT SERPL-CCNC: 69 U/L (ref 10–40)
ANION GAP SERPL CALCULATED.3IONS-SCNC: 11 MMOL/L (ref 4–16)
AST SERPL-CCNC: 60 IU/L (ref 15–37)
BASOPHILS ABSOLUTE: 0 K/CU MM
BASOPHILS RELATIVE PERCENT: 0.1 % (ref 0–1)
BILIRUB SERPL-MCNC: 0.3 MG/DL (ref 0–1)
BUN BLDV-MCNC: 9 MG/DL (ref 6–23)
CALCIUM SERPL-MCNC: 8.9 MG/DL (ref 8.3–10.6)
CHLORIDE BLD-SCNC: 102 MMOL/L (ref 99–110)
CO2: 25 MMOL/L (ref 21–32)
CREAT SERPL-MCNC: 0.7 MG/DL (ref 0.6–1.1)
DIFFERENTIAL TYPE: ABNORMAL
EOSINOPHILS ABSOLUTE: 0.3 K/CU MM
EOSINOPHILS RELATIVE PERCENT: 2.8 % (ref 0–3)
GFR AFRICAN AMERICAN: >60 ML/MIN/1.73M2
GFR NON-AFRICAN AMERICAN: >60 ML/MIN/1.73M2
GLUCOSE BLD-MCNC: 107 MG/DL (ref 70–99)
HCT VFR BLD CALC: 47 % (ref 37–47)
HEMOGLOBIN: 15.7 GM/DL (ref 12.5–16)
LYMPHOCYTES ABSOLUTE: 3 K/CU MM
LYMPHOCYTES RELATIVE PERCENT: 32.9 % (ref 24–44)
MCH RBC QN AUTO: 33.4 PG (ref 27–31)
MCHC RBC AUTO-ENTMCNC: 33.4 % (ref 32–36)
MCV RBC AUTO: 100 FL (ref 78–100)
MONOCYTES ABSOLUTE: 0.9 K/CU MM
MONOCYTES RELATIVE PERCENT: 9.8 % (ref 0–4)
PDW BLD-RTO: 14.5 % (ref 11.7–14.9)
PLATELET # BLD: 293 K/CU MM (ref 140–440)
PMV BLD AUTO: 10.7 FL (ref 7.5–11.1)
POTASSIUM SERPL-SCNC: 4 MMOL/L (ref 3.5–5.1)
RBC # BLD: 4.7 M/CU MM (ref 4.2–5.4)
SEGMENTED NEUTROPHILS ABSOLUTE COUNT: 4.9 K/CU MM
SEGMENTED NEUTROPHILS RELATIVE PERCENT: 54.4 % (ref 36–66)
SODIUM BLD-SCNC: 138 MMOL/L (ref 135–145)
TOTAL PROTEIN: 7 GM/DL (ref 6.4–8.2)
WBC # BLD: 9 K/CU MM (ref 4–10.5)

## 2021-09-20 PROCEDURE — 70491 CT SOFT TISSUE NECK W/DYE: CPT

## 2021-09-20 PROCEDURE — 71260 CT THORAX DX C+: CPT

## 2021-09-20 PROCEDURE — 85025 COMPLETE CBC W/AUTO DIFF WBC: CPT

## 2021-09-20 PROCEDURE — 6360000004 HC RX CONTRAST MEDICATION: Performed by: INTERNAL MEDICINE

## 2021-09-20 PROCEDURE — 86300 IMMUNOASSAY TUMOR CA 15-3: CPT

## 2021-09-20 PROCEDURE — 80053 COMPREHEN METABOLIC PANEL: CPT

## 2021-09-20 PROCEDURE — 36415 COLL VENOUS BLD VENIPUNCTURE: CPT

## 2021-09-20 RX ORDER — SODIUM CHLORIDE 0.9 % (FLUSH) 0.9 %
5-40 SYRINGE (ML) INJECTION PRN
Status: DISCONTINUED | OUTPATIENT
Start: 2021-09-20 | End: 2021-09-21 | Stop reason: HOSPADM

## 2021-09-20 RX ADMIN — IOPAMIDOL 75 ML: 755 INJECTION, SOLUTION INTRAVENOUS at 12:00

## 2021-09-22 ENCOUNTER — CLINICAL DOCUMENTATION (OUTPATIENT)
Dept: CASE MANAGEMENT | Age: 56
End: 2021-09-22

## 2021-09-22 LAB — CA 27.29: 18.5 U/ML

## 2021-10-13 ENCOUNTER — OFFICE VISIT (OUTPATIENT)
Dept: ONCOLOGY | Age: 56
End: 2021-10-13
Payer: COMMERCIAL

## 2021-10-13 ENCOUNTER — HOSPITAL ENCOUNTER (OUTPATIENT)
Dept: INFUSION THERAPY | Age: 56
Discharge: HOME | End: 2021-10-13
Payer: COMMERCIAL

## 2021-10-13 VITALS
DIASTOLIC BLOOD PRESSURE: 81 MMHG | TEMPERATURE: 99.3 F | RESPIRATION RATE: 16 BRPM | BODY MASS INDEX: 30.22 KG/M2 | HEART RATE: 109 BPM | WEIGHT: 177 LBS | SYSTOLIC BLOOD PRESSURE: 176 MMHG | HEIGHT: 64 IN | OXYGEN SATURATION: 97 %

## 2021-10-13 DIAGNOSIS — R22.1 NECK FULLNESS: ICD-10-CM

## 2021-10-13 DIAGNOSIS — Z17.0 MALIGNANT NEOPLASM OF LEFT BREAST IN FEMALE, ESTROGEN RECEPTOR POSITIVE, UNSPECIFIED SITE OF BREAST (HCC): Primary | ICD-10-CM

## 2021-10-13 DIAGNOSIS — C50.912 MALIGNANT NEOPLASM OF LEFT BREAST IN FEMALE, ESTROGEN RECEPTOR POSITIVE, UNSPECIFIED SITE OF BREAST (HCC): Primary | ICD-10-CM

## 2021-10-13 PROCEDURE — 99211 OFF/OP EST MAY X REQ PHY/QHP: CPT

## 2021-10-13 PROCEDURE — 99214 OFFICE O/P EST MOD 30 MIN: CPT | Performed by: INTERNAL MEDICINE

## 2021-10-13 NOTE — PROGRESS NOTES
Patient Name: Dexter Conrad  Patient : 1965  Patient MRN: [id number]     Primary Oncologist: Shai Manzo MD  PCP:Dr Margaret Golden     Date of Service: 10/13/2021      Chief Complaint:   Chief Complaint   Patient presents with    Follow-up    Other     Pt states she is still having problems with her neck        Active Problem list  No diagnosis found. HPI:        Mrs. Moses Floyd is an extremely pleasant young lady, patient of Dr. Gage Gibson and MARCELA Arrington Date of birth 1965. She saw Ms. Margaret Golden on 2015, with about a one week history of noticing a lump involving the lateral part of her left breast, which led to mammogram on 2015, as well as ultrasound showing suspicious abnormality involving the left breast measuring around 0.7 cm. Also normal left axillary lymph node. She did undergo ultrasound confirming the above findings and on 2015, did undergo ultrasound-guided biopsy of the left breast as well as ultrasound-guided biopsy of the left axillary node, too. Pathology from the left breast at 4 o'clock position revealed infiltrating ductal carcinoma. Left axillary node did reveal metastatic ductal carcinoma. ER 95 percent positive, MN 95 percent positive, HER-2 negative. .. Saw Dr. Gage Gibson on 2015, was scheduled to have a lumpectomy as well as axillary dissection on 2015. ... 2015, she did undergo lumpectomy and axillary dissection per Dr. Gage Gibson. Pathology revealed multifocal three separate lesions measuring 0.7, 0.7, and 0.3 cm. Moderately differentiated infiltrating ductal carcinoma. Margins were negative, 5/15 lymph nodes were positive for macrometastases, one with micrometastases; pT1b N2a disease. Recovered well from surgery. A port was inserted, too. ...      2015, chest x-ray was negative for any abnormality, bone scan 2015, was negative for any obvious metastases, too.     CT scan of the chest done on May 7, 2015, revealed 1.3 by 1.9 cm left axillary versus chest wall area lymph node like structure indeterminate nature could be postoperative; however, metastatic disease was thought of, too. No CT evidence of any metastatic disease in the chest. A 3 mm non-calcified subpleural nodule in the right costophrenic angle, which had been stable since September 2014, too. Cardiac echo done on May 7, 2015, had revealed normal ejection fraction, too. ... May 11, 2015, started on day 1, day 8 A/C and finished on August 10, 2015, tolerated these treatments with mild degree of symptoms of weakness, tiredness, as well as hot flashes and weight gain. September 1, 2015, started on weekly Taxol, finishing on November 30, 2015, with some symptoms of weakness and tiredness and some symptoms of neuropathy as well as some skin changes. December 10, 2015, did talk about the disease process and need to proceed with radiation therapy treatments as well as treatment with tamoxifen. Did receive radiation therapy treatments per Dr. Hina Gates from January 11, 2016, to March 1, 2016. Tolerated those treatments with mild degree of morbidity. CT scan of the chest June 23, 2016, was negative for any obvious metastases. Mammogram May 2016 too was negative. She came to see me on August 6, 2018. On her previous visit to the office in July 2017, Effexor dose was increased to 75 mg daily, was encouraged to stay on tamoxifen and refrain from smoking. On her visit, stated she was taking tamoxifen on a regular basis but was still smoking. Had a mammogram done on July 26, 2018 and that was unremarkable. Was seen in the office on February 26, 2019. Stated that after her last visit here on August 6, 2018 she did stop smoking. Mammogram done on March 28, 2019 was benign. She came to see me on February 20, 2020. In the interim she had been taking the tamoxifen on a regular basis. Had been working.  Stated that she has started smoking again. She did complain of having significant worsening of her bony pain involving the neck area having to take ibuprofen 600 mg 2 or 3 times per day. Studies done after her visit here included bone scan that was negative for any definite metastatic changes. MRI of the cervical spine revealed some muscle spasm causing straightening of the cervical spine. There was moderate C5-C6 spinal canal stenosis. Tumor markers were unremarkable so was chemistries except for mild elevation of liver function studies possibly from the use of alcohol. CBC had revealed mild macrocytosis also possibly from the use of alcohol. Dr Noemy Alonso Notes:   ------------------------  2/3/21: mammogram:  1. No mammographic evidence of malignancy. 2. Stable lumpectomy changes in the left outer breast at 9 o'clock.       BIRADS:   BIRADS - CATEGORY 2       PAST MEDICAL PROBLEMS:  Mrs. Spencer in general had been blessed with good health. Has not had any major health problems to speak of, except for tonsillectomy and D&C. FAMILY HISTORY:  Great maternal aunt had carcinoma of the breast. Interestingly her two other sister in laws also had carcinoma of the breast, too. SOCIAL HISTORY:  Long history of smoking, more than one pack for 30 years or more, moderate history of ethanol intake. She is , mother of two boys, and works at [employer] Interval History  10/13/2021: Arrived alone to the clinic today. Reported hot flashes which are stable, but not well controlled with Effexor. No chest pain, increased sob, palpitations or any  dizzness. No new breast masses. No nipple discharge. No axillary area. No weight loss. No abdominal pain, nausea, emesis, diarrhea or any constipation. No  sx. No HA. Reported fatigue. Smokes 1/2 PPD. Reported neck fullness bilaterally left > right. Lot of crying and screaming. Has not been able to sleep.      Review of Systems   Per interval history; otherwise 10 point ROS is negative Vital Signs:  BP (!) 176/81 (Site: Right Upper Arm, Position: Sitting, Cuff Size: Medium Adult)   Pulse 109   Temp 99.3 °F (37.4 °C) (Infrared)   Resp 16   Ht 5' 4\" (1.626 m)   Wt 177 lb (80.3 kg)   LMP 05/06/2015   SpO2 97%   BMI 30.38 kg/m²     Physical Exam:    CONSTITUTIONAL: awake, alert, obese, tired appearing. EYES:RAN, No palor or any icetrus  ENT: ATNC  NECK: No JVD, bilateral neck fullness. Did not appreciate any lad  HEMATOLOGIC/LYMPHATIC: no cervical, supraclavicular or axillary lymphadenopathy   LUNGS: CTAB  CARDIOVASCULAR: s1s2 tachycardia, no murmurs  ABDOMEN: soft ntnd b spos  NEUROLOGIC: GI  SKIN: No rash  EXTREMITIES: no LE edema bilaterally  Breast: Right breast with no mass, left breast, intact scar inferiolateral, No mass, no nipple discharge. No axillary lad bilaterally.      Labs:    Hematology:  Lab Results   Component Value Date    WBC 9.0 09/20/2021    RBC 4.70 09/20/2021    HGB 15.7 09/20/2021    HCT 47.0 09/20/2021    .0 09/20/2021    MCH 33.4 (H) 09/20/2021    MCHC 33.4 09/20/2021    RDW 14.5 09/20/2021     09/20/2021    MPV 10.7 09/20/2021    SEGSPCT 54.4 09/20/2021    EOSRELPCT 2.8 09/20/2021    BASOPCT 0.1 09/20/2021    LYMPHOPCT 32.9 09/20/2021    MONOPCT 9.8 (H) 09/20/2021    SEGSABS 4.9 09/20/2021    EOSABS 0.3 09/20/2021    BASOSABS 0.0 09/20/2021    LYMPHSABS 3.0 09/20/2021    MONOSABS 0.9 09/20/2021    DIFFTYPE AUTOMATED DIFFERENTIAL 09/20/2021     No results found for: ESR    Chemistry:  Lab Results   Component Value Date     09/20/2021    K 4.0 09/20/2021     09/20/2021    CO2 25 09/20/2021    BUN 9 09/20/2021    CREATININE 0.7 09/20/2021    GLUCOSE 107 (H) 09/20/2021    CALCIUM 8.9 09/20/2021    PROT 7.0 09/20/2021    LABALBU 4.3 09/20/2021    BILITOT 0.3 09/20/2021    ALKPHOS 78 09/20/2021    AST 60 (H) 09/20/2021    ALT 69 (H) 09/20/2021    LABGLOM >60 09/20/2021    GFRAA >60 09/20/2021     No results found for: MMA, LDH, HOMOCYSTEINE  No components found for: LD  No results found for: TSHHS, T4FREE, FT3    Immunology:  Lab Results   Component Value Date    PROT 7.0 09/20/2021     No results found for: Pérez Guard, KLFLCR  No results found for: B2M    Coagulation Panel:  No results found for: PROTIME, INR, APTT, DDIMER    Anemia Panel:  No results found for: CTIGNXON40, FOLATE    Tumor Markers:  Lab Results   Component Value Date    LABCA2 18.5 09/20/2021         Imaging: Reviewed     Pathology:Reviewed     Observations:  Performance Status: ECOG 0  Depression Status: No data recorded          Assessment & Plan:  Extremely pleasant young lady, longstanding history of smoking, mild to moderate degree of ethanol intake. Premenopausal.     Felt a lump involving the left breast, which lead to mammogram, ultrasound, and biopsy on April 8, 2015, showing infiltrating moderately-differentiated ductal carcinoma, strongly positive ER, SD, and negative HER-2. April 16, 2015, did undergo additional lumpectomy and axillary dissection. Three separate lesions were noted, 0.7, 0.7, 0.3 cm, and 5/15 axillary nodes were positive for involvement. Chest x-ray, bone scan, and CT scan of the chest were negative for any obvious metastases. A CT of the chest did show a small lymph node like structure in the axillary area or the chest wall area possibly relating to surgery, too. A 4 mm nodule was described, too. A cardiac echo had revealed a normal ejection fraction, too. May 20, 2015, started on A/C day one day eight schedule, finishing August 10, 2015. September 1, 2015, started on weekly Taxol, finishing on November 30, 2015. Tolerated these treatments with mild to moderate degree of morbidit    December 10, 2015, did talk about starting therapy with tamoxifen as well as radiation therapy. Finished receiving radiation therapy treatments as described above on March 1, 2016. June 2016 started on Effexor for hot flashes. Long discussion with her on her visit to the office on August 6, 2018. We talked about of course need for her to stay on tamoxifen that she had been taking on a regular basis did write her a new prescription of 20 mg tablets daily. We talked about staying on Effexor to manage some of her symptoms of hot flashes    We also talked about need for her to have colonoscopy exam. She though wanted for now to hold off as her grandmother had bad experience with a colonoscopy. Mammogram March 2019 was benign. March 17, 2020 we did review the results. No obvious signs of recurrence of the disease. Did reassure her in that regards. Advised her of course to stay on tamoxifen and December 2020 possibly will need to change to an AI. She was noted to have mild elevation of MCV as well as liver function studies possibly relating to alcohol. She was to see me again in 6 months with repeat blood work. We talked about that on her next visit 1 of my partners may be seeing her    Dr Benito Doty Notes  ------------------------  IDC left breast: [id number], stage IIIA, grade 2, ER positive, CT positive Her 2 zaki negative. Diagnosed April 8 2015 and underwent lumpectomy April 15 2015. May 20, 2015, started on A/C day one day eight schedule, finishing August 10, 2015. September 1, 2015, started on weekly Taxol, finishing on November 30, 2015. Started Tamoxifen December 2015. Finished receiving radiation therapy treatments as described above on March 1, 2016. Mammogram 2021 feb BIRADS cat 2. Will repeat in a year. Switched to AI in feb 2021  and recommend for 5 Years. Baseline dexa scan in feb 2021  with osteopenia. Recommend calcium 600mg po bid  and vitamin D 500-1000IU daily. Recommend repeat dexa scan in 2 years  Ca 27-29 18 sep 2021   Will follow per NCCN guidelines    Bilateral neck fullness: Ultrasound neck with small 0.7 cm cervical LN.  CT neck and chest with no evidence of recurrence , enlarged LN    Host flashes: Continue Effexor    H/O depression/bipolar affective disorder:will resume Olanzapine. Mood better. Depression     Tachycardia: Asx. Recommend monitoring OR and maintaining a log and may need cardiology referal. Decreased caffeine intake and also recommend smoking cessation. PEDRO nodule: Stable. Will continue to monitor as h/o smoking tob. Continue the medical care. Discussed above findings and plan with her and she verbalized understanding. Answered all her questions. Discussed healthy lifestyle including regular exercise as tolerated, healthy diet and weight loss if possible. Also discussed importance of being up-to-date with age-appropriate screening tools. CT lung with no lung nodules, will repeat in a year    Recommend follow-up with primary care physician and other specialist.    Please do not hesitate to contact us if you need further information. Return to clinic jan 2022 or earlier if new symptoms.     [id number]

## 2021-10-13 NOTE — Clinical Note
[address] [place] [id number]  Phone: [phone]  Fax: [phone]    Ta Yu MD        October 13, 2021     Patient: Rhona Rodríguez   YOB: 1965   Date of Visit: 10/13/2021       To Whom It May Concern: It is my medical opinion that Aminae Sheets {Work release (duty restriction):93867}. If you have any questions or concerns, please don't hesitate to call.     Sincerely,        Ta Yu MD

## 2021-10-13 NOTE — PROGRESS NOTES
MA Rooming Questions  Patient: Ivan Nava  MRN: [id number]    Date: 10/13/2021        1. Do you have any new issues? yes - Pt states she is still having trouble with her neck         2. Do you need any refills on medications?    no    3. Have you had any imaging done since your last visit?   no    4. Have you been hospitalized or seen in the emergency room since your last visit here?   no    5. Did the patient have a depression screening completed today?  No    No data recorded     PHQ-9 Given to (if applicable):               PHQ-9 Score (if applicable):                     [] Positive     []  Negative              Does question #9 need addressed (if applicable)                     [] Yes    []  No               Thomas Wagner MA

## 2021-12-17 RX ORDER — VENLAFAXINE 75 MG/1
75 TABLET ORAL 3 TIMES DAILY
Qty: 90 TABLET | Refills: 5 | Status: SHIPPED | OUTPATIENT
Start: 2021-12-17 | End: 2021-12-17 | Stop reason: SDUPTHER

## 2021-12-17 RX ORDER — VENLAFAXINE 75 MG/1
75 TABLET ORAL 3 TIMES DAILY
Qty: 21 TABLET | Refills: 0 | Status: SHIPPED | OUTPATIENT
Start: 2021-12-17 | End: 2021-12-24

## 2021-12-17 NOTE — TELEPHONE ENCOUNTER
Patient is out and waiting for her mail service to deliver long term medication and is in need for a short term supply (7 days).     Sent short term supply to [facility], Fadi Kemp    Thank you,    Anahy Toure

## 2022-04-01 ENCOUNTER — HOSPITAL ENCOUNTER (EMERGENCY)
Age: 57
Discharge: HOME | End: 2022-04-01
Payer: COMMERCIAL

## 2022-04-01 VITALS
HEIGHT: 64 IN | HEART RATE: 80 BPM | SYSTOLIC BLOOD PRESSURE: 149 MMHG | RESPIRATION RATE: 16 BRPM | BODY MASS INDEX: 29.02 KG/M2 | TEMPERATURE: 98.5 F | WEIGHT: 170 LBS | OXYGEN SATURATION: 97 % | DIASTOLIC BLOOD PRESSURE: 83 MMHG

## 2022-04-01 DIAGNOSIS — F41.9 ANXIOUSNESS: Primary | ICD-10-CM

## 2022-04-01 PROCEDURE — 99284 EMERGENCY DEPT VISIT MOD MDM: CPT

## 2022-04-01 NOTE — ED PROVIDER NOTES
EMERGENCY DEPARTMENT ENCOUNTER        PCP: No primary care provider on file. CHIEF COMPLAINT    Chief Complaint   Patient presents with    Anxiety     anxiety attack       This patient was not evaluated by the attending physician. I have independently evaluated this patient. My supervising physician was available for consultation. HPI      Delores Edward is a 49 y.o. female with PMH of anxiety, bipolar 1 disorder, breast cancer who presents after [place] anxiety attack. \"  Onset was shortly prior to arrival.  Patient reports that she began to feel increasingly anxious on her way to work around 6 AM or 6:30 AM.  She reports that she has a history of anxiety and she was just recently started back on medication to help with her anxiety but her primary care provider is working on titrating up the dosages and finding the right medication to help. Patient reports that today her \"anxiety attack\" is the same as previous episodes. She reports that she felt very anxious and like her heart was racing. She reports that she was breathing very quickly the episode. EMS was called by her workplace. She reports since arriving in the emergency department all of her symptoms have resolved and she is back to baseline state of health. Patient does report drinking a beer and a few shots of fireball last night. Denies alcohol usage this morning. Denies illicit drug usage. REVIEW OF SYSTEMS    Constitutional:  Denies fever, chills  HENT:  Denies sore throat or ear pain   Cardiovascular: + palpitations; Denies chest pain  Respiratory:   + SOB;  Denies cough, hemoptysis    GI:  Denies abdominal pain, nausea, vomiting, or diarrhea  :  Denies any urinary symptoms  Musculoskeletal:  Denies back pain, extremity swelling  Skin:  Denies rash  Neurologic:  Denies syncope, lightheadedness, dizziness, headache, focal weakness or sensory changes   Endocrine:  Denies polyuria or polydypsia   Lymphatic:  Denies swollen glands Psychiatric: See HPI;  Denies SI/HI    All other review of systems are negative  See HPI and nursing notes for additional information         PAST MEDICAL & SURGICAL HISTORY    Past Medical History:   Diagnosis Date    Anxiety     [facility]     Breast cancer ([facility] [address] 01/01/2015    left    Cancer [facility])     path report 4/2015- ductal Ca( left breast)    Depression      Past Surgical History:   Procedure Laterality Date    BREAST LUMPECTOMY Left 4/16/2015    with axillary sampling, port insertion on right    BREAST SURGERY  4/2015    left breast bx     DILATION AND CURETTAGE OF UTERUS      x3- last one [address] Right 4/28/2021    PORT REMOVAL performed by Kings Franco MD at [address]    Current Outpatient Rx   Medication Sig Dispense Refill    venlafaxine (EFFEXOR) 75 MG tablet Take 1 tablet by mouth 3 times daily for 7 days 21 tablet 0    OLANZapine (ZYPREXA) 5 MG tablet Take 1 tablet by mouth nightly 90 tablet 3    ibuprofen (ADVIL;MOTRIN) 600 MG tablet Take 600 mg by mouth every 6 hours as needed for Pain      anastrozole (ARIMIDEX) 1 MG tablet Take 1 tablet by mouth daily 90 tablet 5       ALLERGIES    No Known Allergies    SOCIAL & FAMILY HISTORY    Social History     Socioeconomic History    Marital status:      Spouse name: Not on file    Number of children: Not on file    Years of education: Not on file    Highest education level: Not on file   Occupational History    Not on file   Tobacco Use    Smoking status: Current Every Day Smoker     Packs/day: 0.50     Years: 30.00     Pack years: 15.00     Types: Cigarettes    Smokeless tobacco: Never Used   Vaping Use    Vaping Use: Never used   Substance and Sexual Activity    Alcohol use: Yes     Comment: occ=average twice per week    Drug use: Not Currently     Comment: crack    Sexual activity: Not on file   Other Topics Concern    Not on file Social History Narrative    Not on file     Social Determinants of Health     Financial Resource Strain:     Difficulty of Paying Living Expenses: Not on file   Food Insecurity:     Worried About Running Out of Food in the Last Year: Not on file    Judy of Food in the Last Year: Not on file   Transportation Needs:     Lack of Transportation (Medical): Not on file    Lack of Transportation (Non-Medical): Not on file   Physical Activity:     Days of Exercise per Week: Not on file    Minutes of Exercise per Session: Not on file   Stress:     Feeling of Stress : Not on file   Social Connections:     Frequency of Communication with Friends and Family: Not on file    Frequency of Social Gatherings with Friends and Family: Not on file    Attends [employer] Services: Not on file    Active Member of [address] [place] [address] [employer] or Organizations: Not on file    Attends Club or Organization Meetings: Not on file    Marital Status: Not on file   Intimate Partner Violence:     Fear of Current or Ex-Partner: Not on file    Emotionally Abused: Not on file    Physically Abused: Not on file    Sexually Abused: Not on file   Housing Stability:     Unable to Pay for Housing in the Last Year: Not on file    Number of [place] in the Last Year: Not on file    Unstable Housing in the Last Year: Not on file     Family History   Problem Relation Age of Onset    Breast Cancer Other     Breast Cancer Other        PHYSICAL EXAM    VITAL SIGNS: BP (!) 144/79   Pulse 74   Temp 98.5 °F (36.9 °C) (Oral)   Resp 18   Ht 5' 4\" (1.626 m)   Wt 170 lb (77.1 kg)   LMP 05/06/2015   SpO2 97%   BMI 29.18 kg/m²    Constitutional:  Well developed, well nourished, no acute distress. Appears calm. HENT:  Atraumatic, moist mucus membranes.  Patient's breath smells of alcohol  Neck: supple  Respiratory:  Lungs Clear, no retractions, no wheezing, rhonchi, rales, no accessory muscle usage  Cardiovascular:  Rate regular, regular Rhythm, no murmurs/rubs/gallops. Vascular: Radial and DP pulses 2+ equal bilaterally  GI:  Soft, nontender, normal bowel sounds  Musculoskeletal:  No acute gross deformities. Integument:  Skin warm and dry, no petechiae   Neurologic:  Alert & oriented x 4, normal speech without slurring. Normal coordination present. Normal strength present in all extremities. Gait is steady and without difficulty. Psych: Pleasant affect, no hallucinations. Patient appears well at this time. Good eye contact. Does not appear to be interacting with internal stimuli. Thought process is linear and coherent. ED COURSE & MEDICAL DECISION MAKING       Vital signs and nursing notes reviewed during ED course. I have independently evaluated this patient. Supervising physician present in the Emergency Department, available for consultation, throughout entirety of patient care. Patient presents as above after a self-reported anxiety attack. Patient appears calm currently in the ED. All of her symptoms have resolved. Patient offered work-up in the ED today with labs, imaging, and EKG but declines. She reports that she will follow up with her PCP for her symptoms and already has a scheduled follow-up. She reports that episode today similar to prior episodes without change in nature or severity. Patient's breath does smell of alcohol and she admits to drinking alcohol last night but is clinically sober here in the emergency department. She is competent to make decisions at this time. Patient will be discharged at this time but she is instructed to not drive today as her breath does not smell alcohol and she admits to drinking alcohol last night. Patient will be discharged home with sober ride. Patient is comfortable with discharge home with close outpatient follow up. Patient is nontoxic appearing. Vital signs are stable. All pertinent Lab data and radiographic results reviewed with patient at bedside.   The patient and/or the family were informed of the results of any tests/labs/imaging, the treatment plan, and time was allotted to answer questions. Diagnosis, disposition, and treatment plan reviewed in detail with patient. Patient understands and agrees to follow up with her PCP for recheck as soon as possible. Patient understands and agrees to return to ED for new or worsening symptoms- strict return precautions given. See chart for details of medications given during the ED stay. Clinical  IMPRESSION    1. Anxiousness        Disposition referral (if applicable): Your family doctor    Schedule an appointment as soon as possible for a visit   Recheck as soon as possible    [facility] Emergency Department  [address] [id number]  [phone]  Go to   Return to ED if symptoms worsen or new symptoms      Disposition medications (if applicable):  New Prescriptions    No medications on file       Comment: Please note this report has been produced using speech recognition software and may contain errors related to that system including errors in grammar, punctuation, and spelling, as well as words and phrases that may be inappropriate. If there are any questions or concerns please feel free to contact the dictating provider for clarification.           Greta Pate PA-C  04/01/22 1038

## 2022-04-04 RX ORDER — ANASTROZOLE 1 MG/1
TABLET ORAL
Qty: 90 TABLET | Refills: 1 | Status: SHIPPED | OUTPATIENT
Start: 2022-04-04 | End: 2022-10-10

## 2022-10-10 RX ORDER — ANASTROZOLE 1 MG/1
TABLET ORAL
Qty: 90 TABLET | Refills: 1 | Status: SHIPPED | OUTPATIENT
Start: 2022-10-10

## 2023-03-31 RX ORDER — ANASTROZOLE 1 MG/1
TABLET ORAL
Qty: 90 TABLET | Refills: 1 | OUTPATIENT
Start: 2023-03-31

## 2023-04-21 ENCOUNTER — OFFICE VISIT (OUTPATIENT)
Dept: ONCOLOGY | Age: 58
End: 2023-04-21
Payer: COMMERCIAL

## 2023-04-21 ENCOUNTER — HOSPITAL ENCOUNTER (OUTPATIENT)
Dept: INFUSION THERAPY | Age: 58
Discharge: HOME | End: 2023-04-21
Payer: COMMERCIAL

## 2023-04-21 VITALS
RESPIRATION RATE: 16 BRPM | DIASTOLIC BLOOD PRESSURE: 70 MMHG | HEART RATE: 85 BPM | SYSTOLIC BLOOD PRESSURE: 134 MMHG | WEIGHT: 190.6 LBS | BODY MASS INDEX: 32.54 KG/M2 | TEMPERATURE: 98.2 F | OXYGEN SATURATION: 96 % | HEIGHT: 64 IN

## 2023-04-21 DIAGNOSIS — R22.1 LOCALIZED SWELLING, MASS AND LUMP, NECK: ICD-10-CM

## 2023-04-21 DIAGNOSIS — C50.912 MALIGNANT NEOPLASM OF LEFT BREAST IN FEMALE, ESTROGEN RECEPTOR POSITIVE, UNSPECIFIED SITE OF BREAST (HCC): Primary | ICD-10-CM

## 2023-04-21 DIAGNOSIS — F17.200 SMOKER: Primary | ICD-10-CM

## 2023-04-21 DIAGNOSIS — R22.1 NECK FULLNESS: ICD-10-CM

## 2023-04-21 DIAGNOSIS — Z17.0 MALIGNANT NEOPLASM OF LEFT BREAST IN FEMALE, ESTROGEN RECEPTOR POSITIVE, UNSPECIFIED SITE OF BREAST (HCC): Primary | ICD-10-CM

## 2023-04-21 DIAGNOSIS — C50.912 MALIGNANT NEOPLASM OF LEFT BREAST IN FEMALE, ESTROGEN RECEPTOR POSITIVE, UNSPECIFIED SITE OF BREAST (HCC): ICD-10-CM

## 2023-04-21 DIAGNOSIS — Z17.0 MALIGNANT NEOPLASM OF LEFT BREAST IN FEMALE, ESTROGEN RECEPTOR POSITIVE, UNSPECIFIED SITE OF BREAST (HCC): ICD-10-CM

## 2023-04-21 LAB
ALBUMIN SERPL-MCNC: 4.7 GM/DL (ref 3.4–5)
ALP BLD-CCNC: 88 IU/L (ref 40–129)
ALT SERPL-CCNC: 48 U/L (ref 10–40)
ANION GAP SERPL CALCULATED.3IONS-SCNC: 12 MMOL/L (ref 4–16)
AST SERPL-CCNC: 42 IU/L (ref 15–37)
BASOPHILS ABSOLUTE: 0 K/CU MM
BASOPHILS RELATIVE PERCENT: 0.2 % (ref 0–1)
BILIRUB SERPL-MCNC: 0.4 MG/DL (ref 0–1)
BUN SERPL-MCNC: 14 MG/DL (ref 6–23)
CALCIUM SERPL-MCNC: 9.5 MG/DL (ref 8.3–10.6)
CHLORIDE BLD-SCNC: 106 MMOL/L (ref 99–110)
CO2: 24 MMOL/L (ref 21–32)
CREAT SERPL-MCNC: 0.9 MG/DL (ref 0.6–1.1)
DIFFERENTIAL TYPE: ABNORMAL
EOSINOPHILS ABSOLUTE: 0.3 K/CU MM
EOSINOPHILS RELATIVE PERCENT: 2.1 % (ref 0–3)
GFR SERPL CREATININE-BSD FRML MDRD: >60 ML/MIN/1.73M2
GLUCOSE SERPL-MCNC: 69 MG/DL (ref 70–99)
HCT VFR BLD CALC: 43.8 % (ref 37–47)
HEMOGLOBIN: 14.6 GM/DL (ref 12.5–16)
LYMPHOCYTES ABSOLUTE: 4.1 K/CU MM
LYMPHOCYTES RELATIVE PERCENT: 34.7 % (ref 24–44)
MCH RBC QN AUTO: 33.3 PG (ref 27–31)
MCHC RBC AUTO-ENTMCNC: 33.3 % (ref 32–36)
MCV RBC AUTO: 100 FL (ref 78–100)
MONOCYTES ABSOLUTE: 0.9 K/CU MM
MONOCYTES RELATIVE PERCENT: 7.5 % (ref 0–4)
PDW BLD-RTO: 13.9 % (ref 11.7–14.9)
PLATELET # BLD: 335 K/CU MM (ref 140–440)
PMV BLD AUTO: 10 FL (ref 7.5–11.1)
POTASSIUM SERPL-SCNC: 4.1 MMOL/L (ref 3.5–5.1)
RBC # BLD: 4.38 M/CU MM (ref 4.2–5.4)
SEGMENTED NEUTROPHILS ABSOLUTE COUNT: 6.5 K/CU MM
SEGMENTED NEUTROPHILS RELATIVE PERCENT: 55.5 % (ref 36–66)
SODIUM BLD-SCNC: 142 MMOL/L (ref 135–145)
TOTAL PROTEIN: 7.3 GM/DL (ref 6.4–8.2)
WBC # BLD: 11.7 K/CU MM (ref 4–10.5)

## 2023-04-21 PROCEDURE — 36415 COLL VENOUS BLD VENIPUNCTURE: CPT

## 2023-04-21 PROCEDURE — 85025 COMPLETE CBC W/AUTO DIFF WBC: CPT

## 2023-04-21 PROCEDURE — 86300 IMMUNOASSAY TUMOR CA 15-3: CPT

## 2023-04-21 PROCEDURE — 99211 OFF/OP EST MAY X REQ PHY/QHP: CPT

## 2023-04-21 PROCEDURE — 99214 OFFICE O/P EST MOD 30 MIN: CPT | Performed by: INTERNAL MEDICINE

## 2023-04-21 PROCEDURE — 80053 COMPREHEN METABOLIC PANEL: CPT

## 2023-04-21 ASSESSMENT — PATIENT HEALTH QUESTIONNAIRE - PHQ9
SUM OF ALL RESPONSES TO PHQ QUESTIONS 1-9: 0
2. FEELING DOWN, DEPRESSED OR HOPELESS: 0
SUM OF ALL RESPONSES TO PHQ QUESTIONS 1-9: 0
1. LITTLE INTEREST OR PLEASURE IN DOING THINGS: 0
SUM OF ALL RESPONSES TO PHQ9 QUESTIONS 1 & 2: 0
SUM OF ALL RESPONSES TO PHQ QUESTIONS 1-9: 0
SUM OF ALL RESPONSES TO PHQ QUESTIONS 1-9: 0

## 2023-04-21 NOTE — PROGRESS NOTES
MA Rooming Questions  Patient: Sanaz Lua  MRN: [id number]    Date: 4/21/2023        1. Do you have any new issues?   no         2. Do you need any refills on medications?    no    3. Have you had any imaging done since your last visit?   no    4. Have you been hospitalized or seen in the emergency room since your last visit here?   yes - pt states she had a bladder surgery (lift) at [facility] in March 2023    5. Did the patient have a depression screening completed today?  Yes    No data recorded     PHQ-9 Given to (if applicable):               PHQ-9 Score (if applicable):                     [] Positive     []  Negative              Does question #9 need addressed (if applicable)                     [] Yes    []  No               Jacinto Armendariz MA
04/21/2023    MCHC 33.3 04/21/2023    RDW 13.9 04/21/2023     04/21/2023    MPV 10.0 04/21/2023    SEGSPCT 55.5 04/21/2023    EOSRELPCT 2.1 04/21/2023    BASOPCT 0.2 04/21/2023    LYMPHOPCT 34.7 04/21/2023    MONOPCT 7.5 (H) 04/21/2023    SEGSABS 6.5 04/21/2023    EOSABS 0.3 04/21/2023    BASOSABS 0.0 04/21/2023    LYMPHSABS 4.1 04/21/2023    MONOSABS 0.9 04/21/2023    DIFFTYPE AUTOMATED DIFFERENTIAL 04/21/2023     No results found for: ESR    Chemistry:  Lab Results   Component Value Date     09/20/2021    K 4.0 09/20/2021     09/20/2021    CO2 25 09/20/2021    BUN 9 09/20/2021    CREATININE 0.7 09/20/2021    GLUCOSE 107 (H) 09/20/2021    CALCIUM 8.9 09/20/2021    PROT 7.0 09/20/2021    LABALBU 4.3 09/20/2021    BILITOT 0.3 09/20/2021    ALKPHOS 78 09/20/2021    AST 60 (H) 09/20/2021    ALT 69 (H) 09/20/2021    LABGLOM >60 09/20/2021    GFRAA >60 09/20/2021     No results found for: MMA, LDH, HOMOCYSTEINE  No results found for: LD  No results found for: TSHHS, T4FREE, FT3    Immunology:  Lab Results   Component Value Date    PROT 7.0 09/20/2021     No results found for: Iona Bolder, KLFLCR  No results found for: B2M    Coagulation Panel:  No results found for: PROTIME, INR, APTT, DDIMER    Anemia Panel:  No results found for: QYCYQCJL66, FOLATE    Tumor Markers:  Lab Results   Component Value Date    LABCA2 18.5 09/20/2021         Imaging: Reviewed     Pathology:Reviewed     Observations:  Performance Status: ECOG 0  Depression Status: PHQ-9 Total Score: 0 (4/21/2023  3:47 PM)            Assessment & Plan:  Extremely pleasant young lady, longstanding history of smoking, mild to moderate degree of ethanol intake. Premenopausal.     Felt a lump involving the left breast, which lead to mammogram, ultrasound, and biopsy on April 8, 2015, showing infiltrating moderately-differentiated ductal carcinoma, strongly positive ER, GA, and negative HER-2.  April 16, 2015, did undergo additional

## 2023-04-24 LAB — CANCER AG27-29 SERPL-ACNC: 16.1 U/ML

## 2023-04-26 DIAGNOSIS — Z78.0 MENOPAUSE: Primary | ICD-10-CM

## 2023-05-05 ENCOUNTER — HOSPITAL ENCOUNTER (OUTPATIENT)
Dept: ULTRASOUND IMAGING | Age: 58
Discharge: HOME | End: 2023-05-05
Payer: COMMERCIAL

## 2023-05-05 ENCOUNTER — HOSPITAL ENCOUNTER (OUTPATIENT)
Dept: WOMENS IMAGING | Age: 58
Discharge: HOME | End: 2023-05-05
Payer: COMMERCIAL

## 2023-05-05 ENCOUNTER — HOSPITAL ENCOUNTER (OUTPATIENT)
Dept: CT IMAGING | Age: 58
Discharge: HOME | End: 2023-05-05
Payer: COMMERCIAL

## 2023-05-05 DIAGNOSIS — C50.912 MALIGNANT NEOPLASM OF LEFT BREAST IN FEMALE, ESTROGEN RECEPTOR POSITIVE, UNSPECIFIED SITE OF BREAST (HCC): ICD-10-CM

## 2023-05-05 DIAGNOSIS — Z17.0 MALIGNANT NEOPLASM OF LEFT BREAST IN FEMALE, ESTROGEN RECEPTOR POSITIVE, UNSPECIFIED SITE OF BREAST (HCC): ICD-10-CM

## 2023-05-05 DIAGNOSIS — F17.200 SMOKER: ICD-10-CM

## 2023-05-05 DIAGNOSIS — N64.4 MASTODYNIA: ICD-10-CM

## 2023-05-05 PROCEDURE — G0279 TOMOSYNTHESIS, MAMMO: HCPCS

## 2023-05-05 PROCEDURE — 71271 CT THORAX LUNG CANCER SCR C-: CPT

## 2023-05-08 ENCOUNTER — HOSPITAL ENCOUNTER (OUTPATIENT)
Dept: WOMENS IMAGING | Age: 58
Discharge: HOME | End: 2023-05-08
Payer: COMMERCIAL

## 2023-05-08 DIAGNOSIS — Z78.0 MENOPAUSE: ICD-10-CM

## 2023-05-08 PROCEDURE — 77080 DXA BONE DENSITY AXIAL: CPT

## 2023-05-09 ENCOUNTER — CLINICAL DOCUMENTATION (OUTPATIENT)
Dept: RADIATION ONCOLOGY | Age: 58
End: 2023-05-09

## 2023-05-09 DIAGNOSIS — C50.912 MALIGNANT NEOPLASM OF LEFT BREAST IN FEMALE, ESTROGEN RECEPTOR POSITIVE, UNSPECIFIED SITE OF BREAST (HCC): Primary | ICD-10-CM

## 2023-05-09 DIAGNOSIS — Z17.0 MALIGNANT NEOPLASM OF LEFT BREAST IN FEMALE, ESTROGEN RECEPTOR POSITIVE, UNSPECIFIED SITE OF BREAST (HCC): Primary | ICD-10-CM

## 2023-05-09 NOTE — PROGRESS NOTES
This nurse called the patient @ [phone] to review lab results. Patient was notified that Dr Abby Nagel recommends for her to begin taking Calcium 600 mg BID and Vitamin D 1000iu daily. This nurse also notified the patient that Dr Abby Nagel recommends she begin wt bearing exercises and the patient would like to have a referral to [facility] [place]. This nurse placed the referral to social work per patient request. Patient verbalized understanding and denies further needs at this time.

## 2023-10-25 ENCOUNTER — HOSPITAL ENCOUNTER (OUTPATIENT)
Dept: INFUSION THERAPY | Age: 58
Discharge: HOME | End: 2023-10-25
Payer: COMMERCIAL

## 2023-10-25 DIAGNOSIS — Z17.0 MALIGNANT NEOPLASM OF LEFT BREAST IN FEMALE, ESTROGEN RECEPTOR POSITIVE, UNSPECIFIED SITE OF BREAST (HCC): ICD-10-CM

## 2023-10-25 DIAGNOSIS — C50.912 MALIGNANT NEOPLASM OF LEFT BREAST IN FEMALE, ESTROGEN RECEPTOR POSITIVE, UNSPECIFIED SITE OF BREAST (HCC): ICD-10-CM

## 2023-10-25 LAB
ALBUMIN SERPL-MCNC: 4.1 GM/DL (ref 3.4–5)
ALP BLD-CCNC: 119 IU/L (ref 40–128)
ALT SERPL-CCNC: 73 U/L (ref 10–40)
ANION GAP SERPL CALCULATED.3IONS-SCNC: 12 MMOL/L (ref 4–16)
AST SERPL-CCNC: 70 IU/L (ref 15–37)
BASOPHILS ABSOLUTE: 0 K/CU MM
BASOPHILS RELATIVE PERCENT: 0.2 % (ref 0–1)
BILIRUB SERPL-MCNC: 0.4 MG/DL (ref 0–1)
BUN SERPL-MCNC: 13 MG/DL (ref 6–23)
CALCIUM SERPL-MCNC: 9.8 MG/DL (ref 8.3–10.6)
CHLORIDE BLD-SCNC: 102 MMOL/L (ref 99–110)
CO2: 24 MMOL/L (ref 21–32)
CREAT SERPL-MCNC: 0.7 MG/DL (ref 0.6–1.1)
DIFFERENTIAL TYPE: ABNORMAL
EOSINOPHILS ABSOLUTE: 0.2 K/CU MM
EOSINOPHILS RELATIVE PERCENT: 2.4 % (ref 0–3)
GFR SERPL CREATININE-BSD FRML MDRD: >60 ML/MIN/1.73M2
GLUCOSE SERPL-MCNC: 184 MG/DL (ref 70–99)
HCT VFR BLD CALC: 47 % (ref 37–47)
HEMOGLOBIN: 15.4 GM/DL (ref 12.5–16)
LYMPHOCYTES ABSOLUTE: 3.3 K/CU MM
LYMPHOCYTES RELATIVE PERCENT: 34.7 % (ref 24–44)
MCH RBC QN AUTO: 33.3 PG (ref 27–31)
MCHC RBC AUTO-ENTMCNC: 32.8 % (ref 32–36)
MCV RBC AUTO: 101.5 FL (ref 78–100)
MONOCYTES ABSOLUTE: 0.7 K/CU MM
MONOCYTES RELATIVE PERCENT: 7.8 % (ref 0–4)
PDW BLD-RTO: 13.9 % (ref 11.7–14.9)
PLATELET # BLD: 297 K/CU MM (ref 140–440)
PMV BLD AUTO: 10.1 FL (ref 7.5–11.1)
POTASSIUM SERPL-SCNC: 4.4 MMOL/L (ref 3.5–5.1)
RBC # BLD: 4.63 M/CU MM (ref 4.2–5.4)
SEGMENTED NEUTROPHILS ABSOLUTE COUNT: 5.2 K/CU MM
SEGMENTED NEUTROPHILS RELATIVE PERCENT: 54.9 % (ref 36–66)
SODIUM BLD-SCNC: 138 MMOL/L (ref 135–145)
TOTAL PROTEIN: 6.7 GM/DL (ref 6.4–8.2)
WBC # BLD: 9.4 K/CU MM (ref 4–10.5)

## 2023-10-25 PROCEDURE — 36415 COLL VENOUS BLD VENIPUNCTURE: CPT

## 2023-10-25 PROCEDURE — 86300 IMMUNOASSAY TUMOR CA 15-3: CPT

## 2023-10-25 PROCEDURE — 85025 COMPLETE CBC W/AUTO DIFF WBC: CPT

## 2023-10-25 PROCEDURE — 80053 COMPREHEN METABOLIC PANEL: CPT

## 2023-10-27 ENCOUNTER — HOSPITAL ENCOUNTER (OUTPATIENT)
Dept: INFUSION THERAPY | Age: 58
Discharge: HOME | End: 2023-10-27
Payer: COMMERCIAL

## 2023-10-27 ENCOUNTER — OFFICE VISIT (OUTPATIENT)
Dept: ONCOLOGY | Age: 58
End: 2023-10-27
Payer: COMMERCIAL

## 2023-10-27 VITALS
BODY MASS INDEX: 33.97 KG/M2 | RESPIRATION RATE: 16 BRPM | HEART RATE: 95 BPM | HEIGHT: 64 IN | OXYGEN SATURATION: 95 % | SYSTOLIC BLOOD PRESSURE: 146 MMHG | TEMPERATURE: 97.8 F | WEIGHT: 199 LBS | DIASTOLIC BLOOD PRESSURE: 86 MMHG

## 2023-10-27 DIAGNOSIS — R22.1 NECK FULLNESS: ICD-10-CM

## 2023-10-27 DIAGNOSIS — R22.1 LOCALIZED SWELLING, MASS AND LUMP, NECK: ICD-10-CM

## 2023-10-27 DIAGNOSIS — Z17.0 MALIGNANT NEOPLASM OF LEFT BREAST IN FEMALE, ESTROGEN RECEPTOR POSITIVE, UNSPECIFIED SITE OF BREAST (HCC): Primary | ICD-10-CM

## 2023-10-27 DIAGNOSIS — C50.912 MALIGNANT NEOPLASM OF LEFT BREAST IN FEMALE, ESTROGEN RECEPTOR POSITIVE, UNSPECIFIED SITE OF BREAST (HCC): Primary | ICD-10-CM

## 2023-10-27 PROCEDURE — 99211 OFF/OP EST MAY X REQ PHY/QHP: CPT

## 2023-10-27 PROCEDURE — 99214 OFFICE O/P EST MOD 30 MIN: CPT | Performed by: INTERNAL MEDICINE

## 2023-10-27 ASSESSMENT — PATIENT HEALTH QUESTIONNAIRE - PHQ9
2. FEELING DOWN, DEPRESSED OR HOPELESS: 1
SUM OF ALL RESPONSES TO PHQ QUESTIONS 1-9: 2
1. LITTLE INTEREST OR PLEASURE IN DOING THINGS: 1
SUM OF ALL RESPONSES TO PHQ QUESTIONS 1-9: 2
SUM OF ALL RESPONSES TO PHQ9 QUESTIONS 1 & 2: 2
SUM OF ALL RESPONSES TO PHQ QUESTIONS 1-9: 2
SUM OF ALL RESPONSES TO PHQ QUESTIONS 1-9: 2

## 2023-10-27 NOTE — PROGRESS NOTES
MA Rooming Questions  Patient: Kirstin Holland  MRN: [id number]    Date: 10/27/2023        1. Do you have any new issues? yes - L side of her breast spasms x 1 mth         2. Do you need any refills on medications?    no    3. Have you had any imaging done since your last visit?   no    4. Have you been hospitalized or seen in the emergency room since your last visit here?   no    5. Did the patient have a depression screening completed today?  Yes    No data recorded     PHQ-9 Given to (if applicable):               PHQ-9 Score (if applicable):                     [] Positive     []  Negative              Does question #9 need addressed (if applicable)                     [] Yes    []  No               Darryl Taylor MA
about that on her next visit 1 of my partners may be seeing her    Dr Edwin Sibley Notes  ------------------------  IDC left breast: [id number], stage IIIA, grade 2, ER positive, LA positive Her 2 zaki negative. Diagnosed April 8 2015 and underwent lumpectomy April 15 2015. May 20, 2015, started on A/C day one day eight schedule, finishing August 10, 2015. September 1, 2015, started on weekly Taxol, finishing on November 30, 2015. Started Tamoxifen December 2015. Finished receiving radiation therapy treatments as described above on March 1, 2016. Mammogram 2021 feb BIRADS cat 2. Will repeat in a year. Switched to AI in feb 2021  and recommend for 5 Years. Baseline dexa scan in feb 2021  with osteopenia. Recommend calcium 600mg po bid  and vitamin D 500-1000IU daily. Recommend repeat dexa scan in 2 years  April 2023 CA 27-29 was normal.  October 2023 CA 27-29 pending. May 2023 mammogram category 2. Will follow per NCCN guidelines    Elevated liver enzymes: has h/o fatty liver per ultrasound of the abdomen, recommend weight loss. She is going to start home exercises. Host flashes: Continue Effexor. Stable sx. H/O depression/bipolar affective disorder: Continue current regimen, follows with mental health    PEDRO nodule: CT scan of the chest in May 2023 with no evidence of any pulmonary nodules. Continue yearly lung cancer screening CT. Recommend smoking cessation. Osteopenia: Recommend calcium and vitamin D supplements. Recommend weightbearing exercises. Continue the medical care. Discussed above findings and plan with her and she verbalized understanding. Answered all her questions. Discussed healthy lifestyle including regular exercise as tolerated, healthy diet and weight loss if possible. Also discussed importance of being up-to-date with age-appropriate screening tools. CT lung with no lung nodules, will repeat   Discussed colonoscopy or at least stool study    Recommend follow-up with

## 2023-10-28 LAB — CANCER AG27-29 SERPL-ACNC: 18.3 U/ML

## 2024-05-20 ENCOUNTER — HOSPITAL ENCOUNTER (OUTPATIENT)
Dept: INFUSION THERAPY | Age: 59
Discharge: HOME | End: 2024-05-20
Payer: COMMERCIAL

## 2024-05-20 DIAGNOSIS — R22.1 NECK FULLNESS: ICD-10-CM

## 2024-05-20 DIAGNOSIS — R22.1 LOCALIZED SWELLING, MASS AND LUMP, NECK: ICD-10-CM

## 2024-05-20 DIAGNOSIS — Z17.0 MALIGNANT NEOPLASM OF LEFT BREAST IN FEMALE, ESTROGEN RECEPTOR POSITIVE, UNSPECIFIED SITE OF BREAST (HCC): ICD-10-CM

## 2024-05-20 DIAGNOSIS — C50.912 MALIGNANT NEOPLASM OF LEFT BREAST IN FEMALE, ESTROGEN RECEPTOR POSITIVE, UNSPECIFIED SITE OF BREAST (HCC): ICD-10-CM

## 2024-05-20 LAB
ALBUMIN SERPL-MCNC: 4.2 GM/DL (ref 3.4–5)
ALP BLD-CCNC: 97 IU/L (ref 40–128)
ALT SERPL-CCNC: 39 U/L (ref 10–40)
ANION GAP SERPL CALCULATED.3IONS-SCNC: 11 MMOL/L (ref 7–16)
AST SERPL-CCNC: 36 IU/L (ref 15–37)
BASOPHILS ABSOLUTE: 0 K/CU MM
BASOPHILS RELATIVE PERCENT: 0.2 % (ref 0–1)
BILIRUB SERPL-MCNC: 0.3 MG/DL (ref 0–1)
BUN SERPL-MCNC: 9 MG/DL (ref 6–23)
CALCIUM SERPL-MCNC: 9.1 MG/DL (ref 8.3–10.6)
CHLORIDE BLD-SCNC: 102 MMOL/L (ref 99–110)
CO2: 28 MMOL/L (ref 21–32)
CREAT SERPL-MCNC: 0.7 MG/DL (ref 0.6–1.1)
DIFFERENTIAL TYPE: ABNORMAL
EOSINOPHILS ABSOLUTE: 0.2 K/CU MM
EOSINOPHILS RELATIVE PERCENT: 1.8 % (ref 0–3)
GFR, ESTIMATED: >90 ML/MIN/1.73M2
GLUCOSE SERPL-MCNC: 202 MG/DL (ref 70–99)
HCT VFR BLD CALC: 49.5 % (ref 37–47)
HEMOGLOBIN: 16.4 GM/DL (ref 12.5–16)
LYMPHOCYTES ABSOLUTE: 3.1 K/CU MM
LYMPHOCYTES RELATIVE PERCENT: 29.7 % (ref 24–44)
MCH RBC QN AUTO: 33.5 PG (ref 27–31)
MCHC RBC AUTO-ENTMCNC: 33.1 % (ref 32–36)
MCV RBC AUTO: 101 FL (ref 78–100)
MONOCYTES ABSOLUTE: 0.5 K/CU MM
MONOCYTES RELATIVE PERCENT: 4.9 % (ref 0–4)
NEUTROPHILS ABSOLUTE: 6.5 K/CU MM
NEUTROPHILS RELATIVE PERCENT: 63.4 % (ref 36–66)
PDW BLD-RTO: 13.7 % (ref 11.7–14.9)
PLATELET # BLD: 327 K/CU MM (ref 140–440)
PMV BLD AUTO: 10.3 FL (ref 7.5–11.1)
POTASSIUM SERPL-SCNC: 4.5 MMOL/L (ref 3.5–5.1)
RBC # BLD: 4.9 M/CU MM (ref 4.2–5.4)
SODIUM BLD-SCNC: 141 MMOL/L (ref 135–145)
TOTAL PROTEIN: 7 GM/DL (ref 6.4–8.2)
WBC # BLD: 10.3 K/CU MM (ref 4–10.5)

## 2024-05-20 PROCEDURE — 80053 COMPREHEN METABOLIC PANEL: CPT

## 2024-05-20 PROCEDURE — 85025 COMPLETE CBC W/AUTO DIFF WBC: CPT

## 2024-05-20 PROCEDURE — 86300 IMMUNOASSAY TUMOR CA 15-3: CPT

## 2024-05-20 PROCEDURE — 36415 COLL VENOUS BLD VENIPUNCTURE: CPT

## 2024-05-21 LAB — CANCER AG27-29 SERPL-ACNC: 21.9 U/ML

## 2024-06-06 ENCOUNTER — HOSPITAL ENCOUNTER (OUTPATIENT)
Dept: INFUSION THERAPY | Age: 59
Discharge: HOME | End: 2024-06-06
Payer: COMMERCIAL

## 2024-06-06 ENCOUNTER — OFFICE VISIT (OUTPATIENT)
Dept: ONCOLOGY | Age: 59
End: 2024-06-06
Payer: COMMERCIAL

## 2024-06-06 VITALS
TEMPERATURE: 97.2 F | BODY MASS INDEX: 31.51 KG/M2 | WEIGHT: 184.6 LBS | SYSTOLIC BLOOD PRESSURE: 147 MMHG | HEIGHT: 64 IN | HEART RATE: 87 BPM | RESPIRATION RATE: 18 BRPM | OXYGEN SATURATION: 96 % | DIASTOLIC BLOOD PRESSURE: 67 MMHG

## 2024-06-06 DIAGNOSIS — Z17.0 MALIGNANT NEOPLASM OF LEFT BREAST IN FEMALE, ESTROGEN RECEPTOR POSITIVE, UNSPECIFIED SITE OF BREAST (HCC): Primary | ICD-10-CM

## 2024-06-06 DIAGNOSIS — F17.200 SMOKER: Primary | ICD-10-CM

## 2024-06-06 DIAGNOSIS — C50.912 MALIGNANT NEOPLASM OF LEFT BREAST IN FEMALE, ESTROGEN RECEPTOR POSITIVE, UNSPECIFIED SITE OF BREAST (HCC): Primary | ICD-10-CM

## 2024-06-06 PROCEDURE — 99214 OFFICE O/P EST MOD 30 MIN: CPT | Performed by: INTERNAL MEDICINE

## 2024-06-06 PROCEDURE — 99211 OFF/OP EST MAY X REQ PHY/QHP: CPT

## 2024-06-06 RX ORDER — VENLAFAXINE 75 MG/1
75 TABLET ORAL 3 TIMES DAILY
Qty: 21 TABLET | Refills: 0 | Status: SHIPPED | OUTPATIENT
Start: 2024-06-06 | End: 2024-06-13

## 2024-06-06 RX ORDER — ANASTROZOLE 1 MG/1
1 TABLET ORAL DAILY
Qty: 90 TABLET | Refills: 5 | Status: SHIPPED | OUTPATIENT
Start: 2024-06-06 | End: 2025-11-28

## 2024-06-06 RX ORDER — OLANZAPINE 5 MG/1
5 TABLET ORAL NIGHTLY
Qty: 90 TABLET | Refills: 0 | Status: SHIPPED | OUTPATIENT
Start: 2024-06-06 | End: 2024-09-04

## 2024-06-06 RX ORDER — ANASTROZOLE 1 MG/1
1 TABLET ORAL DAILY
Qty: 90 TABLET | Refills: 1 | Status: CANCELLED | OUTPATIENT
Start: 2024-06-06

## 2024-06-06 ASSESSMENT — PATIENT HEALTH QUESTIONNAIRE - PHQ9
SUM OF ALL RESPONSES TO PHQ9 QUESTIONS 1 & 2: 4
SUM OF ALL RESPONSES TO PHQ QUESTIONS 1-9: 4
SUM OF ALL RESPONSES TO PHQ QUESTIONS 1-9: 4
2. FEELING DOWN, DEPRESSED OR HOPELESS: MORE THAN HALF THE DAYS
1. LITTLE INTEREST OR PLEASURE IN DOING THINGS: MORE THAN HALF THE DAYS
SUM OF ALL RESPONSES TO PHQ QUESTIONS 1-9: 4
SUM OF ALL RESPONSES TO PHQ QUESTIONS 1-9: 4

## 2024-06-06 NOTE — PROGRESS NOTES
MA Rooming Questions  Patient: Sima Spencer  MRN: [id number]    Date: 6/6/2024        1. Do you have any new issues?   no         2. Do you need any refills on medications?    yes - arimidex +  olanzapine + effexor    3. Have you had any imaging done since your last visit?   no    4. Have you been hospitalized or seen in the emergency room since your last visit here?   no    5. Did the patient have a depression screening completed today? Yes    PHQ-9 Total Score: 4 (6/6/2024  9:25 AM)       PHQ-9 Given to (if applicable):               PHQ-9 Score (if applicable):                     [] Positive     []  Negative              Does question #9 need addressed (if applicable)                     [] Yes    []  No               Pamela Love MA    
additional lumpectomy and axillary dissection. Three separate lesions were noted, 0.7, 0.7, 0.3 cm, and 5/15 axillary nodes were positive for involvement.     Chest x-ray, bone scan, and CT scan of the chest were negative for any obvious metastases. A CT of the chest did show a small lymph node like structure in the axillary area or the chest wall area possibly relating to surgery, too. A 4 mm nodule was described, too.     A cardiac echo had revealed a normal ejection fraction, too.     May 20, 2015, started on A/C day one day eight schedule, finishing August 10, 2015. September 1, 2015, started on weekly Taxol, finishing on November 30, 2015. Tolerated these treatments with mild to moderate degree of morbidit    December 10, 2015, did talk about starting therapy with tamoxifen as well as radiation therapy.       Finished receiving radiation therapy treatments as described above on March 1, 2016.      June 2016 started on Effexor for hot flashes.   Long discussion with her on her visit to the office on August 6, 2018. We talked about of course need for her to stay on tamoxifen that she had been taking on a regular basis did write her a new prescription of 20 mg tablets daily.    We talked about staying on Effexor to manage some of her symptoms of hot flashes    We also talked about need for her to have colonoscopy exam. She though wanted for now to hold off as her grandmother had bad experience with a colonoscopy.     Mammogram March 2019 was benign.    March 17, 2020 we did review the results. No obvious signs of recurrence of the disease. Did reassure her in that regards. Advised her of course to stay on tamoxifen and December 2020 possibly will need to change to an AI. She was noted to have mild elevation of MCV as well as liver function studies possibly relating to alcohol.    She was to see me again in 6 months with repeat blood work. We talked about that on her next visit 1 of my partners may be seeing

## 2024-06-13 ENCOUNTER — TELEPHONE (OUTPATIENT)
Dept: ONCOLOGY | Age: 59
End: 2024-06-13

## 2024-06-13 NOTE — TELEPHONE ENCOUNTER
6/13/24 - tried to call pt but unable to leave vm message. Pt is scheduled for CT lung screening on 6/19/24 arrival time of 3:30 at [facility]. No prep

## 2024-06-19 ENCOUNTER — HOSPITAL ENCOUNTER (OUTPATIENT)
Dept: CT IMAGING | Age: 59
Discharge: HOME | End: 2024-06-19
Attending: INTERNAL MEDICINE
Payer: COMMERCIAL

## 2024-06-19 DIAGNOSIS — F17.200 SMOKER: ICD-10-CM

## 2024-06-19 PROCEDURE — 71271 CT THORAX LUNG CANCER SCR C-: CPT

## 2024-09-11 RX ORDER — OLANZAPINE 5 MG/1
5 TABLET ORAL NIGHTLY
Qty: 90 TABLET | Refills: 0 | Status: SHIPPED | OUTPATIENT
Start: 2024-09-11

## 2024-11-01 PROBLEM — Z12.31 ENCOUNTER FOR SCREENING MAMMOGRAM FOR HIGH-RISK PATIENT: Status: RESOLVED | Noted: 2021-01-28 | Resolved: 2024-11-01

## 2024-12-06 ENCOUNTER — HOSPITAL ENCOUNTER (OUTPATIENT)
Dept: INFUSION THERAPY | Age: 59
Discharge: HOME | End: 2024-12-06
Payer: COMMERCIAL

## 2024-12-06 DIAGNOSIS — Z17.0 MALIGNANT NEOPLASM OF LEFT BREAST IN FEMALE, ESTROGEN RECEPTOR POSITIVE, UNSPECIFIED SITE OF BREAST (HCC): ICD-10-CM

## 2024-12-06 DIAGNOSIS — C50.912 MALIGNANT NEOPLASM OF LEFT BREAST IN FEMALE, ESTROGEN RECEPTOR POSITIVE, UNSPECIFIED SITE OF BREAST (HCC): ICD-10-CM

## 2024-12-06 LAB
ALBUMIN SERPL-MCNC: 4.1 G/DL (ref 3.4–5)
ALBUMIN/GLOB SERPL: 1.6 {RATIO} (ref 1.1–2.2)
ALP SERPL-CCNC: 82 U/L (ref 40–129)
ALT SERPL-CCNC: 27 U/L (ref 10–40)
ANION GAP SERPL CALCULATED.3IONS-SCNC: 11 MMOL/L (ref 9–17)
AST SERPL-CCNC: 26 U/L (ref 15–37)
BASOPHILS # BLD: 0.02 K/UL
BASOPHILS NFR BLD: 0 % (ref 0–1)
BILIRUB SERPL-MCNC: 0.5 MG/DL (ref 0–1)
BUN SERPL-MCNC: 12 MG/DL (ref 7–20)
CALCIUM SERPL-MCNC: 10 MG/DL (ref 8.3–10.6)
CHLORIDE SERPL-SCNC: 104 MMOL/L (ref 99–110)
CO2 SERPL-SCNC: 28 MMOL/L (ref 21–32)
CREAT SERPL-MCNC: 0.8 MG/DL (ref 0.6–1.1)
EOSINOPHIL # BLD: 0.28 K/UL
EOSINOPHILS RELATIVE PERCENT: 3 % (ref 0–3)
ERYTHROCYTE [DISTWIDTH] IN BLOOD BY AUTOMATED COUNT: 14.3 % (ref 11.7–14.9)
GFR, ESTIMATED: 76 ML/MIN/1.73M2
GLUCOSE SERPL-MCNC: 94 MG/DL (ref 74–99)
HCT VFR BLD AUTO: 47.8 % (ref 37–47)
HGB BLD-MCNC: 16.2 G/DL (ref 12.5–16)
LYMPHOCYTES NFR BLD: 3.09 K/UL
LYMPHOCYTES RELATIVE PERCENT: 33 % (ref 24–44)
MCH RBC QN AUTO: 33.6 PG (ref 27–31)
MCHC RBC AUTO-ENTMCNC: 33.9 G/DL (ref 32–36)
MCV RBC AUTO: 99.2 FL (ref 78–100)
MONOCYTES NFR BLD: 0.58 K/UL
MONOCYTES NFR BLD: 6 % (ref 0–4)
NEUTROPHILS NFR BLD: 57 % (ref 36–66)
NEUTS SEG NFR BLD: 5.33 K/UL
PLATELET # BLD AUTO: 270 K/UL (ref 140–440)
PMV BLD AUTO: 9.8 FL (ref 7.5–11.1)
POTASSIUM SERPL-SCNC: 4.2 MMOL/L (ref 3.5–5.1)
PROT SERPL-MCNC: 6.8 G/DL (ref 6.4–8.2)
RBC # BLD AUTO: 4.82 M/UL (ref 4.2–5.4)
SODIUM SERPL-SCNC: 144 MMOL/L (ref 136–145)
WBC OTHER # BLD: 9.3 K/UL (ref 4–10.5)

## 2024-12-06 PROCEDURE — 80053 COMPREHEN METABOLIC PANEL: CPT

## 2024-12-06 PROCEDURE — 86300 IMMUNOASSAY TUMOR CA 15-3: CPT

## 2024-12-06 PROCEDURE — 36415 COLL VENOUS BLD VENIPUNCTURE: CPT

## 2024-12-06 PROCEDURE — 85025 COMPLETE CBC W/AUTO DIFF WBC: CPT

## 2024-12-09 RX ORDER — OLANZAPINE 5 MG/1
5 TABLET ORAL NIGHTLY
Qty: 90 TABLET | Refills: 0 | Status: SHIPPED | OUTPATIENT
Start: 2024-12-09

## 2024-12-09 NOTE — TELEPHONE ENCOUNTER
Patient left message requesting a refill for Zyprexa to be sent to [facility]. Pending RX to Provider to be sent to pharmacy.

## 2024-12-11 LAB — CANCER AG27-29 SERPL-ACNC: 18 U/ML (ref 0–38)

## 2024-12-12 NOTE — PROGRESS NOTES
1100:  Discharge instructions given to include COVID education. Pt verbalized an understanding. 1110:  Pt discharged to home alert and oriented with no c/o pain or discomfort. Incision to right upper chest well approx with surgical glue inplace. No bleeding or drainage noted. Pt ambulatory with no c/o at time of discharge. MEDICATIONS  (STANDING):  aspirin  chewable 81 milliGRAM(s) Oral daily  atorvastatin 40 milliGRAM(s) Oral at bedtime  dextrose 5% + lactated ringers. 1000 milliLiter(s) (40 mL/Hr) IV Continuous <Continuous>  doxazosin 1 milliGRAM(s) Oral at bedtime  enoxaparin Injectable 40 milliGRAM(s) SubCutaneous every 24 hours  ferrous    sulfate 325 milliGRAM(s) Oral daily  folic acid 1 milliGRAM(s) Oral daily  magnesium oxide 400 milliGRAM(s) Oral daily  metoprolol tartrate 50 milliGRAM(s) Oral every 12 hours  midodrine. 2.5 milliGRAM(s) Oral <User Schedule>  silver nitrate Applicator 1 Application(s) Topical once    MEDICATIONS  (PRN):  acetaminophen     Tablet .. 650 milliGRAM(s) Oral every 6 hours PRN Temp greater or equal to 38C (100.4F), Mild Pain (1 - 3)  aluminum hydroxide/magnesium hydroxide/simethicone Suspension 30 milliLiter(s) Oral every 4 hours PRN Dyspepsia  melatonin 3 milliGRAM(s) Oral at bedtime PRN Insomnia  ondansetron Injectable 4 milliGRAM(s) IV Push every 8 hours PRN Nausea and/or Vomiting

## 2024-12-13 ENCOUNTER — HOSPITAL ENCOUNTER (OUTPATIENT)
Dept: INFUSION THERAPY | Age: 59
Discharge: HOME | End: 2024-12-13
Payer: COMMERCIAL

## 2024-12-13 ENCOUNTER — OFFICE VISIT (OUTPATIENT)
Dept: ONCOLOGY | Age: 59
End: 2024-12-13
Payer: COMMERCIAL

## 2024-12-13 VITALS
TEMPERATURE: 97.9 F | OXYGEN SATURATION: 97 % | HEART RATE: 80 BPM | SYSTOLIC BLOOD PRESSURE: 135 MMHG | BODY MASS INDEX: 28.34 KG/M2 | DIASTOLIC BLOOD PRESSURE: 80 MMHG | WEIGHT: 166 LBS | HEIGHT: 64 IN

## 2024-12-13 DIAGNOSIS — C50.912 MALIGNANT NEOPLASM OF LEFT BREAST IN FEMALE, ESTROGEN RECEPTOR POSITIVE, UNSPECIFIED SITE OF BREAST (HCC): Primary | ICD-10-CM

## 2024-12-13 DIAGNOSIS — F17.200 SMOKER: Primary | ICD-10-CM

## 2024-12-13 DIAGNOSIS — Z17.0 MALIGNANT NEOPLASM OF LEFT BREAST IN FEMALE, ESTROGEN RECEPTOR POSITIVE, UNSPECIFIED SITE OF BREAST (HCC): Primary | ICD-10-CM

## 2024-12-13 PROCEDURE — 99214 OFFICE O/P EST MOD 30 MIN: CPT | Performed by: INTERNAL MEDICINE

## 2024-12-13 PROCEDURE — 99211 OFF/OP EST MAY X REQ PHY/QHP: CPT

## 2024-12-13 RX ORDER — ANASTROZOLE 1 MG/1
1 TABLET ORAL DAILY
Qty: 90 TABLET | Refills: 5 | Status: SHIPPED | OUTPATIENT
Start: 2024-12-13 | End: 2026-06-06

## 2024-12-13 ASSESSMENT — PATIENT HEALTH QUESTIONNAIRE - PHQ9
SUM OF ALL RESPONSES TO PHQ QUESTIONS 1-9: 0
2. FEELING DOWN, DEPRESSED OR HOPELESS: NOT AT ALL
SUM OF ALL RESPONSES TO PHQ9 QUESTIONS 1 & 2: 0
1. LITTLE INTEREST OR PLEASURE IN DOING THINGS: NOT AT ALL
SUM OF ALL RESPONSES TO PHQ QUESTIONS 1-9: 0

## 2024-12-13 NOTE — PROGRESS NOTES
Patient Name: Sima Spencre  Patient : 1965  Patient MRN: [id number]     Primary Oncologist: Zuleika Guerra MD  PCP:Dr Zuleta     Date of Service: 2024      Chief Complaint:   Chief Complaint   Patient presents with    Follow-up    Results        Active Problem list  1. Malignant neoplasm of left breast in female, estrogen receptor positive, unspecified site of breast (HCC)             HPI:        Mrs. Spencer is an extremely pleasant young lady, patient of Dr. Wiggins and MARCELA Zee Date of birth 1965.     She saw Ms. Zuleta on 2015, with about a one week history of noticing a lump involving the lateral part of her left breast, which led to mammogram on 2015, as well as ultrasound showing suspicious abnormality involving the left breast measuring around 0.7 cm. Also normal left axillary lymph node. She did undergo ultrasound confirming the above findings and on 2015, did undergo ultrasound-guided biopsy of the left breast as well as ultrasound-guided biopsy of the left axillary node, too. Pathology from the left breast at 4 o'clock position revealed infiltrating ductal carcinoma. Left axillary node did reveal metastatic ductal carcinoma. ER 95 percent positive, MI 95 percent positive, HER-2 negative... Saw Dr. Wiggins on 2015, was scheduled to have a lumpectomy as well as axillary dissection on 2015....     2015, she did undergo lumpectomy and axillary dissection per Dr. Wiggins. Pathology revealed multifocal three separate lesions measuring 0.7, 0.7, and 0.3 cm. Moderately differentiated infiltrating ductal carcinoma. Margins were negative, 5/15 lymph nodes were positive for macrometastases, one with micrometastases; pT1b N2a disease. Recovered well from surgery. A port was inserted, too....     2015, chest x-ray was negative for any abnormality, bone scan 2015, was negative for any obvious metastases, too.

## 2024-12-13 NOTE — PROGRESS NOTES
Patient Name: Sima Spencer  Patient : 1965  Patient MRN: [id number]     Primary Oncologist: Zuleika Guerra MD  PCP:Dr Zuleta     Date of Service: 2024      Chief Complaint:   Chief Complaint   Patient presents with    Follow-up    Results        Active Problem list  1. Malignant neoplasm of left breast in female, estrogen receptor positive, unspecified site of breast (HCC)             HPI:        Mrs. Spencer is an extremely pleasant young lady, patient of Dr. Wiggins and MARCELA Zee Date of birth 1965.     She saw Ms. Zuleta on 2015, with about a one week history of noticing a lump involving the lateral part of her left breast, which led to mammogram on 2015, as well as ultrasound showing suspicious abnormality involving the left breast measuring around 0.7 cm. Also normal left axillary lymph node. She did undergo ultrasound confirming the above findings and on 2015, did undergo ultrasound-guided biopsy of the left breast as well as ultrasound-guided biopsy of the left axillary node, too. Pathology from the left breast at 4 o'clock position revealed infiltrating ductal carcinoma. Left axillary node did reveal metastatic ductal carcinoma. ER 95 percent positive, MT 95 percent positive, HER-2 negative... Saw Dr. Wiggins on 2015, was scheduled to have a lumpectomy as well as axillary dissection on 2015....     2015, she did undergo lumpectomy and axillary dissection per Dr. Wiggins. Pathology revealed multifocal three separate lesions measuring 0.7, 0.7, and 0.3 cm. Moderately differentiated infiltrating ductal carcinoma. Margins were negative, 5/15 lymph nodes were positive for macrometastases, one with micrometastases; pT1b N2a disease. Recovered well from surgery. A port was inserted, too....     2015, chest x-ray was negative for any abnormality, bone scan 2015, was negative for any obvious metastases, too.

## 2024-12-13 NOTE — PROGRESS NOTES
MA Rooming Questions  Patient: Sima Spencer  MRN: [id number]    Date: 12/13/2024        1. Do you have any new issues?   no         2. Do you need any refills on medications?    no    3. Have you had any imaging done since your last visit?   yes - labs 12/6 ct lung 6/19    4. Have you been hospitalized or seen in the emergency room since your last visit here?   no    5. Did the patient have a depression screening completed today? Yes    PHQ-9 Total Score: 0 (12/13/2024  1:06 PM)       PHQ-9 Given to (if applicable):               PHQ-9 Score (if applicable):                     [] Positive     []  Negative              Does question #9 need addressed (if applicable)                     [] Yes    []  No               Hannah Roach CMA

## 2025-02-05 ENCOUNTER — HOSPITAL ENCOUNTER (OUTPATIENT)
Dept: WOMENS IMAGING | Age: 60
Discharge: HOME | End: 2025-02-05
Attending: INTERNAL MEDICINE
Payer: COMMERCIAL

## 2025-02-05 ENCOUNTER — APPOINTMENT (OUTPATIENT)
Dept: ULTRASOUND IMAGING | Age: 60
End: 2025-02-05
Attending: INTERNAL MEDICINE
Payer: COMMERCIAL

## 2025-02-05 VITALS — HEIGHT: 64 IN | BODY MASS INDEX: 28.34 KG/M2 | WEIGHT: 166 LBS

## 2025-02-05 DIAGNOSIS — Z17.0 MALIGNANT NEOPLASM OF LEFT BREAST IN FEMALE, ESTROGEN RECEPTOR POSITIVE, UNSPECIFIED SITE OF BREAST (HCC): ICD-10-CM

## 2025-02-05 DIAGNOSIS — C50.912 MALIGNANT NEOPLASM OF LEFT BREAST IN FEMALE, ESTROGEN RECEPTOR POSITIVE, UNSPECIFIED SITE OF BREAST (HCC): ICD-10-CM

## 2025-02-05 PROCEDURE — G0279 TOMOSYNTHESIS, MAMMO: HCPCS

## 2025-03-26 RX ORDER — OLANZAPINE 5 MG/1
5 TABLET ORAL NIGHTLY
Qty: 90 TABLET | Refills: 0 | Status: SHIPPED | OUTPATIENT
Start: 2025-03-26

## 2025-05-29 ENCOUNTER — TELEPHONE (OUTPATIENT)
Dept: ONCOLOGY | Age: 60
End: 2025-05-29

## 2025-05-29 NOTE — TELEPHONE ENCOUNTER
Called and scheduled patient for CT Lung Screening on 6/23 @ 10:00am at the [facility] with arrival time of 0930a

## 2025-06-23 ENCOUNTER — HOSPITAL ENCOUNTER (OUTPATIENT)
Dept: CT IMAGING | Age: 60
Discharge: HOME | End: 2025-06-23
Attending: INTERNAL MEDICINE
Payer: COMMERCIAL

## 2025-06-23 DIAGNOSIS — F17.200 SMOKER: ICD-10-CM

## 2025-06-23 PROCEDURE — 71271 CT THORAX LUNG CANCER SCR C-: CPT

## 2025-06-27 ENCOUNTER — OFFICE VISIT (OUTPATIENT)
Dept: ONCOLOGY | Age: 60
End: 2025-06-27
Payer: COMMERCIAL

## 2025-06-27 ENCOUNTER — HOSPITAL ENCOUNTER (OUTPATIENT)
Dept: INFUSION THERAPY | Age: 60
Discharge: HOME | End: 2025-06-27
Payer: COMMERCIAL

## 2025-06-27 VITALS
OXYGEN SATURATION: 98 % | BODY MASS INDEX: 27.31 KG/M2 | WEIGHT: 160 LBS | DIASTOLIC BLOOD PRESSURE: 90 MMHG | SYSTOLIC BLOOD PRESSURE: 144 MMHG | HEART RATE: 84 BPM | TEMPERATURE: 98.3 F | HEIGHT: 64 IN

## 2025-06-27 DIAGNOSIS — C50.912 MALIGNANT NEOPLASM OF LEFT BREAST IN FEMALE, ESTROGEN RECEPTOR POSITIVE, UNSPECIFIED SITE OF BREAST (HCC): Primary | ICD-10-CM

## 2025-06-27 DIAGNOSIS — Z17.0 MALIGNANT NEOPLASM OF LEFT BREAST IN FEMALE, ESTROGEN RECEPTOR POSITIVE, UNSPECIFIED SITE OF BREAST (HCC): Primary | ICD-10-CM

## 2025-06-27 DIAGNOSIS — Z17.0 MALIGNANT NEOPLASM OF LEFT BREAST IN FEMALE, ESTROGEN RECEPTOR POSITIVE, UNSPECIFIED SITE OF BREAST (HCC): ICD-10-CM

## 2025-06-27 DIAGNOSIS — C50.912 MALIGNANT NEOPLASM OF LEFT BREAST IN FEMALE, ESTROGEN RECEPTOR POSITIVE, UNSPECIFIED SITE OF BREAST (HCC): ICD-10-CM

## 2025-06-27 LAB
ALBUMIN SERPL-MCNC: 4.2 G/DL (ref 3.4–5)
ALBUMIN/GLOB SERPL: 1.4 {RATIO} (ref 1.1–2.2)
ALP SERPL-CCNC: 79 U/L (ref 40–129)
ALT SERPL-CCNC: 20 U/L (ref 10–40)
ANION GAP SERPL CALCULATED.3IONS-SCNC: 13 MMOL/L (ref 9–17)
AST SERPL-CCNC: 41 U/L (ref 15–37)
BASOPHILS # BLD: 0.02 K/UL
BASOPHILS NFR BLD: 0 % (ref 0–1)
BILIRUB SERPL-MCNC: 0.6 MG/DL (ref 0–1)
BUN SERPL-MCNC: 14 MG/DL (ref 7–20)
CALCIUM SERPL-MCNC: 10.5 MG/DL (ref 8.3–10.6)
CHLORIDE SERPL-SCNC: 101 MMOL/L (ref 99–110)
CO2 SERPL-SCNC: 24 MMOL/L (ref 21–32)
CREAT SERPL-MCNC: 0.9 MG/DL (ref 0.6–1.1)
EOSINOPHIL # BLD: 0.28 K/UL
EOSINOPHILS RELATIVE PERCENT: 3 % (ref 0–3)
ERYTHROCYTE [DISTWIDTH] IN BLOOD BY AUTOMATED COUNT: 13.8 % (ref 11.7–14.9)
GFR, ESTIMATED: 64 ML/MIN/1.73M2
GLUCOSE SERPL-MCNC: 83 MG/DL (ref 74–99)
HCT VFR BLD AUTO: 48.3 % (ref 37–47)
HGB BLD-MCNC: 15.9 G/DL (ref 12.5–16)
LYMPHOCYTES NFR BLD: 3.62 K/UL
LYMPHOCYTES RELATIVE PERCENT: 41 % (ref 24–44)
MCH RBC QN AUTO: 34 PG (ref 27–31)
MCHC RBC AUTO-ENTMCNC: 32.9 G/DL (ref 32–36)
MCV RBC AUTO: 103.2 FL (ref 78–100)
MONOCYTES NFR BLD: 0.52 K/UL
MONOCYTES NFR BLD: 6 % (ref 0–5)
NEUTROPHILS NFR BLD: 49 % (ref 36–66)
NEUTS SEG NFR BLD: 4.3 K/UL
PLATELET # BLD AUTO: 227 K/UL (ref 140–440)
PMV BLD AUTO: 10.7 FL (ref 7.5–11.1)
POTASSIUM SERPL-SCNC: 4.1 MMOL/L (ref 3.5–5.1)
PROT SERPL-MCNC: 7.2 G/DL (ref 6.4–8.2)
RBC # BLD AUTO: 4.68 M/UL (ref 4.2–5.4)
SODIUM SERPL-SCNC: 138 MMOL/L (ref 136–145)
WBC OTHER # BLD: 8.7 K/UL (ref 4–10.5)

## 2025-06-27 PROCEDURE — G8419 CALC BMI OUT NRM PARAM NOF/U: HCPCS | Performed by: INTERNAL MEDICINE

## 2025-06-27 PROCEDURE — 85025 COMPLETE CBC W/AUTO DIFF WBC: CPT

## 2025-06-27 PROCEDURE — 99212 OFFICE O/P EST SF 10 MIN: CPT

## 2025-06-27 PROCEDURE — 36415 COLL VENOUS BLD VENIPUNCTURE: CPT

## 2025-06-27 PROCEDURE — G8428 CUR MEDS NOT DOCUMENT: HCPCS | Performed by: INTERNAL MEDICINE

## 2025-06-27 PROCEDURE — 99214 OFFICE O/P EST MOD 30 MIN: CPT | Performed by: INTERNAL MEDICINE

## 2025-06-27 PROCEDURE — 3017F COLORECTAL CA SCREEN DOC REV: CPT | Performed by: INTERNAL MEDICINE

## 2025-06-27 PROCEDURE — 80053 COMPREHEN METABOLIC PANEL: CPT

## 2025-06-27 PROCEDURE — 4004F PT TOBACCO SCREEN RCVD TLK: CPT | Performed by: INTERNAL MEDICINE

## 2025-06-27 RX ORDER — ANASTROZOLE 1 MG/1
1 TABLET ORAL DAILY
Qty: 90 TABLET | Refills: 5 | Status: SHIPPED | OUTPATIENT
Start: 2025-06-27 | End: 2026-12-19

## 2025-06-27 NOTE — PROGRESS NOTES
MA/LPN Rooming Questions  Patient: Sima Spencer  MRN: [id number]    Date: 6/27/2025        1. Do you have any new issues?   no         2. Do you need any refills on medications?    yes - Arimidex    3. Have you had any imaging done since your last visit?   yes - Mammo 2/5 CT lung 6/23    4. Have you been hospitalized or seen in the emergency room since your last visit here?   no    5. Did the patient have a depression screening completed today? No    No data recorded     PHQ-9 Given to (if applicable):               PHQ-9 Score (if applicable):                     [] Positive     []  Negative              Does question #9 need addressed (if applicable)                     [] Yes    []  No               Shelia Delgado MA

## 2025-06-27 NOTE — PROGRESS NOTES
Patient Name: Sima Spencer  Patient : 1965  Patient MRN: [id number]     Primary Oncologist: Zuleika Guerra MD  PCP:Dr Zuleta     Date of Service: 2025      Chief Complaint:   Chief Complaint   Patient presents with    Follow-up        Active Problem list  1. Malignant neoplasm of left breast in female, estrogen receptor positive, unspecified site of breast (HCC)               HPI:        Mrs. Spencer is an extremely pleasant young lady, patient of Dr. Wiggins and JOLENE ZeeANADEGE Date of birth 1965.     She saw Ms. Zuleta on 2015, with about a one week history of noticing a lump involving the lateral part of her left breast, which led to mammogram on 2015, as well as ultrasound showing suspicious abnormality involving the left breast measuring around 0.7 cm. Also normal left axillary lymph node. She did undergo ultrasound confirming the above findings and on 2015, did undergo ultrasound-guided biopsy of the left breast as well as ultrasound-guided biopsy of the left axillary node, too. Pathology from the left breast at 4 o'clock position revealed infiltrating ductal carcinoma. Left axillary node did reveal metastatic ductal carcinoma. ER 95 percent positive, PA 95 percent positive, HER-2 negative... Saw Dr. Wiggins on 2015, was scheduled to have a lumpectomy as well as axillary dissection on 2015....     2015, she did undergo lumpectomy and axillary dissection per Dr. Wiggins. Pathology revealed multifocal three separate lesions measuring 0.7, 0.7, and 0.3 cm. Moderately differentiated infiltrating ductal carcinoma. Margins were negative, 5/15 lymph nodes were positive for macrometastases, one with micrometastases; pT1b N2a disease. Recovered well from surgery. A port was inserted, too....     2015, chest x-ray was negative for any abnormality, bone scan 2015, was negative for any obvious metastases, too.     CT scan

## 2025-06-28 LAB
MISCELLANEOUS LAB TEST RESULT: NORMAL
TEST NAME: NORMAL

## 2025-07-02 ENCOUNTER — HOSPITAL ENCOUNTER (OUTPATIENT)
Dept: ULTRASOUND IMAGING | Age: 60
Discharge: HOME | End: 2025-07-02
Attending: INTERNAL MEDICINE
Payer: COMMERCIAL

## 2025-07-02 DIAGNOSIS — C50.912 MALIGNANT NEOPLASM OF LEFT BREAST IN FEMALE, ESTROGEN RECEPTOR POSITIVE, UNSPECIFIED SITE OF BREAST (HCC): ICD-10-CM

## 2025-07-02 DIAGNOSIS — Z17.0 MALIGNANT NEOPLASM OF LEFT BREAST IN FEMALE, ESTROGEN RECEPTOR POSITIVE, UNSPECIFIED SITE OF BREAST (HCC): ICD-10-CM

## 2025-07-02 PROCEDURE — 76642 ULTRASOUND BREAST LIMITED: CPT

## 2025-07-18 ENCOUNTER — TELEPHONE (OUTPATIENT)
Dept: ONCOLOGY | Age: 60
End: 2025-07-18

## 2025-07-18 NOTE — TELEPHONE ENCOUNTER
This RN called patient to review left breast US results:    IMPRESSION: Benign postsurgical changes in the left breast at the lumpectomy site.     ASSESSMENT:  BI-RADS 2 - BENIGN     RECOMMENDATION:   Bilateral Routine annual screening mammography in 7 months.     Instructed that no office visit needed until December 2025.  This RN sent message to  to get patient scheduled accordingly.  Patient voiced understanding.   [FreeTextEntry1] : pt requesting hormone level checks, as she is scheduled with Dr. Blackman next week advised to wait until then\par f/u 1 year for annual or new OB

## 2025-07-18 NOTE — TELEPHONE ENCOUNTER
I called the pt to try to schedule a 6 month follow up with Dr Guerra from a request of Nichole GALVEZ. Pt did not answer and her voicemail was full so I was unable to leave a voicemail on 7/18/25.

## 2025-07-21 ENCOUNTER — TELEPHONE (OUTPATIENT)
Dept: ONCOLOGY | Age: 60
End: 2025-07-21

## 2025-07-21 NOTE — TELEPHONE ENCOUNTER
I called the pt to try to schedule a 6 month follow up with Dr Guerra from a request of Nichole GALVEZ. Pt did not answer and her voicemail was full so I was unable to leave a voicemail on 7/21/25. I made her an appt and sent it to her via mail with her last AVS.

## (undated) DEVICE — ADHESIVE SKIN CLSR 0.7ML TOP DERMBND ADV

## (undated) DEVICE — INTENDED FOR TISSUE SEPARATION, AND OTHER PROCEDURES THAT REQUIRE A SHARP SURGICAL BLADE TO PUNCTURE OR CUT.: Brand: BARD-PARKER ® STAINLESS STEEL BLADES

## (undated) DEVICE — GAUZE,SPONGE,4"X4",16PLY,XRAY,STRL,LF: Brand: MEDLINE

## (undated) DEVICE — APPLICATOR MEDICATED 10.5 CC SOLUTION HI LT ORNG CHLORAPREP

## (undated) DEVICE — DRAPE SHEET ULTRAGARD: Brand: MEDLINE

## (undated) DEVICE — COUNTER NDL 30 COUNT FOAM STRP SGL MAG

## (undated) DEVICE — YANKAUER,FLEXIBLE HANDLE,REGLR CAPACITY: Brand: MEDLINE INDUSTRIES, INC.

## (undated) DEVICE — ELECTRODE ES AD CRDLSS PT RET REM POLYHESIVE

## (undated) DEVICE — SUTURE VCRL SZ 3-0 L27IN ABSRB UD L17MM RB-1 1/2 CIR J215H

## (undated) DEVICE — SUTURE VCRL SZ 5-0 L18IN ABSRB UD L13MM P-3 3/8 CIR PRIM J493G

## (undated) DEVICE — PENCIL ES CRD L10FT HND SWCHING ROCK SWCH W/ EDGE COAT BLDE

## (undated) DEVICE — MARKER SURG SKIN UTIL REGULAR/FINE 2 TIP W/ RUL AND 9 LBL

## (undated) DEVICE — TOWEL,OR,DSP,ST,BLUE,STD,6/PK,12PK/CS: Brand: MEDLINE

## (undated) DEVICE — GLOVE SURG SZ 7 CRM LTX FREE POLYISOPRENE POLYMER BEAD ANTI